# Patient Record
Sex: FEMALE | Race: WHITE | NOT HISPANIC OR LATINO | Employment: FULL TIME | URBAN - METROPOLITAN AREA
[De-identification: names, ages, dates, MRNs, and addresses within clinical notes are randomized per-mention and may not be internally consistent; named-entity substitution may affect disease eponyms.]

---

## 2022-02-23 ENCOUNTER — OFFICE VISIT (OUTPATIENT)
Dept: INTERNAL MEDICINE | Facility: CLINIC | Age: 27
End: 2022-02-23

## 2022-02-23 VITALS
BODY MASS INDEX: 38.77 KG/M2 | DIASTOLIC BLOOD PRESSURE: 86 MMHG | OXYGEN SATURATION: 98 % | HEART RATE: 111 BPM | TEMPERATURE: 98 F | HEIGHT: 67 IN | SYSTOLIC BLOOD PRESSURE: 118 MMHG | WEIGHT: 247 LBS

## 2022-02-23 DIAGNOSIS — R05.9 NEW ONSET COUGH: ICD-10-CM

## 2022-02-23 DIAGNOSIS — M25.532 CHRONIC PAIN OF LEFT WRIST: Primary | ICD-10-CM

## 2022-02-23 DIAGNOSIS — G89.29 CHRONIC PAIN OF LEFT WRIST: Primary | ICD-10-CM

## 2022-02-23 PROCEDURE — 99202 OFFICE O/P NEW SF 15 MIN: CPT | Performed by: FAMILY MEDICINE

## 2022-02-23 RX ORDER — FLUTICASONE PROPIONATE AND SALMETEROL XINAFOATE 115; 21 UG/1; UG/1
2 AEROSOL, METERED RESPIRATORY (INHALATION)
Qty: 12 G | Refills: 6 | Status: SHIPPED | OUTPATIENT
Start: 2022-02-23 | End: 2022-10-13

## 2022-02-23 RX ORDER — ALBUTEROL SULFATE 90 UG/1
2 AEROSOL, METERED RESPIRATORY (INHALATION) EVERY 4 HOURS PRN
Qty: 18 G | Refills: 2 | Status: SHIPPED | OUTPATIENT
Start: 2022-02-23

## 2022-02-23 NOTE — PROGRESS NOTES
"Subjective   Sarah Giron is a 26 y.o. female.     Chief Complaint   Patient presents with   • Wrist Pain     NEW PATIENT  Left wrist popping pain and swelling.  Intermittent.  May have broken around 7-8 grade   • Cough     cough and feeling like throat is closing due to exposure to cigarette smoke.  1 months       Visit Vitals  /86   Pulse 111   Temp 98 °F (36.7 °C)   Ht 168.9 cm (66.5\")   Wt 112 kg (247 lb)   LMP 01/25/2022 (Approximate)   SpO2 98%   Breastfeeding No Comment: nexplanon   BMI 39.27 kg/m²         History of Present Illness   Pt here to establish.  Pt has had about 8 yrs of left wrist pain.  Pt had left wrist fx in middle school. Pt had cast with injury.  Pt thinks that the wrist bones are rubbing together.     Pt thinks that she is allergic to cigarette smoke.  Pt has new downstairs neighbors who smoke and the smoke is coming up into her apartment. Pt's sister is getting headaches from the cigarette smoke.  Pt's brother has asthma.   Pt states that she having coughing spasm.  Pt states that she feels that her throat is closing.  Pt's maternal grandmother is allergic to cigarette smoke.     The following portions of the patient's history were reviewed and updated as appropriate: allergies, current medications, past family history, past medical history, past social history, past surgical history and problem list.    Past Medical History:   Diagnosis Date   • Depression April 2010   • Headache       Past Surgical History:   Procedure Laterality Date   • CHOLECYSTECTOMY  12/2010      Family History   Problem Relation Age of Onset   • Diabetes Mother    • Hypertension Mother    • Depression Mother    • Hyperlipidemia Mother    • Migraine headaches Sister    • Migraine headaches Maternal Grandmother    • Migraine headaches Father    • Diabetes Maternal Grandfather    • Alcohol abuse Paternal Grandmother    • Alcohol abuse Paternal Grandfather    • Asthma Brother       Social History "     Socioeconomic History   • Marital status: Single   Tobacco Use   • Smoking status: Never Smoker   • Smokeless tobacco: Never Used   Vaping Use   • Vaping Use: Never used   Substance and Sexual Activity   • Alcohol use: Not Currently     Alcohol/week: 0.0 standard drinks     Comment: Only on special occasions   • Drug use: Never   • Sexual activity: Not Currently     Partners: Male     Birth control/protection: Abstinence      No Known Allergies    Review of Systems   Respiratory: Positive for cough (From tobacco smoke).    Musculoskeletal: Positive for arthralgias (left wrist pain).       Objective   Physical Exam  Vitals and nursing note reviewed.   Constitutional:       Appearance: She is well-developed.   HENT:      Head: Normocephalic.      Right Ear: External ear normal.      Left Ear: External ear normal.      Nose: Nose normal.   Eyes:      General: Lids are normal.      Conjunctiva/sclera: Conjunctivae normal.      Pupils: Pupils are equal, round, and reactive to light.   Neck:      Thyroid: No thyroid mass or thyromegaly.      Vascular: No carotid bruit.      Trachea: Trachea normal.   Cardiovascular:      Rate and Rhythm: Normal rate and regular rhythm.      Heart sounds: No murmur heard.      Pulmonary:      Effort: Pulmonary effort is normal. No respiratory distress.      Breath sounds: Normal breath sounds. No decreased breath sounds, wheezing, rhonchi or rales.   Chest:      Chest wall: No tenderness.   Abdominal:      General: Bowel sounds are normal.      Palpations: Abdomen is soft.      Tenderness: There is no abdominal tenderness.   Musculoskeletal:         General: Normal range of motion.      Left wrist: Swelling, tenderness (distal radius) and crepitus present.      Cervical back: Normal range of motion and neck supple.   Skin:     General: Skin is warm and dry.   Neurological:      Mental Status: She is alert and oriented to person, place, and time.   Psychiatric:         Behavior:  Behavior normal.         Assessment/Plan   Diagnoses and all orders for this visit:    1. Chronic pain of left wrist (Primary)  -     XR Wrist 3+ View Right; Future  -     Ambulatory Referral to Orthopedic Surgery    2. New onset cough  -     albuterol sulfate  (90 Base) MCG/ACT inhaler; Inhale 2 puffs Every 4 (Four) Hours As Needed for Wheezing.  Dispense: 18 g; Refill: 2  -     fluticasone-salmeterol (Advair HFA) 115-21 MCG/ACT inhaler; Inhale 2 puffs 2 (Two) Times a Day. Rinse mouth after use  Dispense: 12 g; Refill: 6      Suspect tobacco smoke allergy.             Current Outpatient Medications:   •  Etonogestrel (NEXPLANON SC), Inject  under the skin into the appropriate area as directed., Disp: , Rfl:   •  albuterol sulfate  (90 Base) MCG/ACT inhaler, Inhale 2 puffs Every 4 (Four) Hours As Needed for Wheezing., Disp: 18 g, Rfl: 2  •  fluticasone-salmeterol (Advair HFA) 115-21 MCG/ACT inhaler, Inhale 2 puffs 2 (Two) Times a Day. Rinse mouth after use, Disp: 12 g, Rfl: 6    Return if symptoms worsen or fail to improve, for Recheck, Annual.

## 2022-03-03 ENCOUNTER — OFFICE VISIT (OUTPATIENT)
Dept: ORTHOPEDIC SURGERY | Facility: CLINIC | Age: 27
End: 2022-03-03

## 2022-03-03 VITALS
BODY MASS INDEX: 39.68 KG/M2 | WEIGHT: 246.91 LBS | DIASTOLIC BLOOD PRESSURE: 72 MMHG | HEIGHT: 66 IN | SYSTOLIC BLOOD PRESSURE: 109 MMHG

## 2022-03-03 DIAGNOSIS — M25.532 LEFT WRIST PAIN: Primary | ICD-10-CM

## 2022-03-03 PROCEDURE — 99203 OFFICE O/P NEW LOW 30 MIN: CPT | Performed by: PHYSICIAN ASSISTANT

## 2022-03-03 NOTE — PROGRESS NOTES
[facility] – [place] [facility] Note        Subjective     Pain of the Left Wrist      HPI    Sarah Giron is a 26 y.o. female.  This is a very pleasant right-hand-dominant female presenting today to discuss her left wrist pain on and off for approximately 10 years.  She reports she did have prior fracture and remembers being casted for 6 weeks but she does not know exactly what was fracture.  She remembers them telling and there was a wrinkle in the bone.  She complains of painful popping with motion of the wrist.  Pain with lifting and working.  She has treated with anti-inflammatories and a brace.  Here for further evaluation treatment recommendations    Past Medical History:   Diagnosis Date   • Depression April 2010   • Fracture of wrist June 2006   • Headache       Past Surgical History:   Procedure Laterality Date   • CHOLECYSTECTOMY  12/2010      Family History   Problem Relation Age of Onset   • Diabetes Mother    • Hypertension Mother    • Depression Mother    • Hyperlipidemia Mother    • Migraine headaches Sister    • Dislocations Sister         Knee   • Migraine headaches Maternal Grandmother    • Migraine headaches Father    • Diabetes Maternal Grandfather    • Broken bones Maternal Grandfather    • Alcohol abuse Paternal Grandmother    • Alcohol abuse Paternal Grandfather    • Asthma Brother    • Broken bones Brother      Social History     Socioeconomic History   • Marital status: Single   Tobacco Use   • Smoking status: Never Smoker   • Smokeless tobacco: Never Used   Vaping Use   • Vaping Use: Never used   Substance and Sexual Activity   • Alcohol use: Not Currently     Alcohol/week: 0.0 standard drinks     Comment: Only on special occasions   • Drug use: Never   • Sexual activity: Not Currently     Partners: Male     Birth control/protection: Abstinence      Current Outpatient Medications on File Prior to Visit   Medication Sig Dispense Refill   • albuterol sulfate  (90 Base)  "MCG/ACT inhaler Inhale 2 puffs Every 4 (Four) Hours As Needed for Wheezing. 18 g 2   • Etonogestrel (NEXPLANON SC) Inject  under the skin into the appropriate area as directed.     • fluticasone-salmeterol (Advair HFA) 115-21 MCG/ACT inhaler Inhale 2 puffs 2 (Two) Times a Day. Rinse mouth after use 12 g 6     No current facility-administered medications on file prior to visit.      No Known Allergies       Review of Systems   Constitutional: Negative.    HENT: Negative.    Eyes: Negative.    Respiratory: Negative.    Cardiovascular: Negative.    Gastrointestinal: Negative.    Endocrine: Negative.    Genitourinary: Negative.    Musculoskeletal: Positive for arthralgias.   Skin: Negative.    Allergic/Immunologic: Negative.    Neurological: Negative.    Hematological: Negative.    Psychiatric/Behavioral: Negative.         I reviewed the patient's chief complaint, history of present illness, review of systems, past medical history, surgical history, family history, social history, medications and allergy list.        Objective      Physical Exam  /72   Ht 168.9 cm (66.5\")   Wt 112 kg (246 lb 14.6 oz)   BMI 39.26 kg/m²     Body mass index is 39.26 kg/m².    General  Mental Status - alert  General Appearance - cooperative, well groomed, not in acute distress  Orientation - Oriented X3  Build & Nutrition - well developed and well nourished  Posture - normal posture  Gait -normal       Ortho Exam  Left wrist exam: Tender palpation over the radiocarpal joint both dorsal and volar.  More tender volar.  Tender over the DRUJ.  Mild stiffness in the wrist.  Normal forearm pronation and supination.  Palpable crepitus with wrist motion.  No tenderness over the carpals.  Able to make a composite fist with good strength.  Neurovascular intact distally.  Pulses 2+.    Imaging/Studies  Imaging Results (Last 24 Hours)     Procedure Component Value Units Date/Time    XR Wrist 3+ View Left [[id number]] Resulted: 03/03/22 1632     " Updated: 03/03/22 1633    Narrative:      Left Wrist X-Ray    Indication: Pain    Views:  AP, Lateral, and Oblique     Comparison: None    Findings:  No fracture  No bony lesion  Normal soft tissues  Normal joint spaces    Impression:   Negative left wrist x-ray for acute bony abnormality            Assessment    Assessment:  1. Left wrist pain          Plan:  Recommend over-the-counter medication as needed for discomfort  Chronic left wrist pain.  I reviewed today's x-rays clinical findings past and current treatment the patient.  She has had pain intermittently for 10 years.  She has palpable crepitus crepitus with motion.  She has history of prior trauma.  She had a negative x-rays today.  She has braced in the past.  Recommendation today is MRI of the left wrist for further evaluation.  She will return to see me following the MRI or sooner if needed.    History, diagnosis and treatment plan discussed with Dr. Rodriguez.          Ly Starr PA-C  03/04/22  13:40 EST

## 2022-04-02 ENCOUNTER — HOSPITAL ENCOUNTER (OUTPATIENT)
Dept: MRI IMAGING | Facility: HOSPITAL | Age: 27
Discharge: HOME | End: 2022-04-02
Admitting: PHYSICIAN ASSISTANT

## 2022-04-02 DIAGNOSIS — M25.532 LEFT WRIST PAIN: ICD-10-CM

## 2022-04-02 PROCEDURE — 73221 MRI JOINT UPR EXTREM W/O DYE: CPT

## 2022-04-15 ENCOUNTER — OFFICE VISIT (OUTPATIENT)
Dept: ORTHOPEDIC SURGERY | Facility: CLINIC | Age: 27
End: 2022-04-15

## 2022-04-15 VITALS
SYSTOLIC BLOOD PRESSURE: 130 MMHG | WEIGHT: 247 LBS | DIASTOLIC BLOOD PRESSURE: 84 MMHG | HEIGHT: 66 IN | BODY MASS INDEX: 39.7 KG/M2

## 2022-04-15 DIAGNOSIS — M25.532 LEFT WRIST PAIN: Primary | ICD-10-CM

## 2022-04-15 PROCEDURE — 99213 OFFICE O/P EST LOW 20 MIN: CPT | Performed by: PHYSICIAN ASSISTANT

## 2022-04-15 NOTE — PROGRESS NOTES
"        [facility] [place] [facility] [place]        Subjective     CC: Follow-up (6 week follow up -- MRI 4/2/22; Left wrist pain //)      VICKIE Giron is a 26 y.o. female.  She returns today for her left wrist MRI.  She has had chronic pain for 10 years.  She reports no change in her symptoms.  She still has mainly ulnar-sided pain.  Has been bracing and anti-inflammatories.    Overall, patient's symptoms are the same    ROS:    Constiutional:Pt denies fever, chills, nausea, or vomiting.  MSK:as above        Objective      Past Medical History  Past Medical History:   Diagnosis Date   • Depression 04/2010   • Fracture of wrist 06/2006   • Headache          Physical Exam  /84   Ht 168.9 cm (66.5\")   Wt 112 kg (247 lb)   BMI 39.27 kg/m²     Body mass index is 39.27 kg/m².    Patient is well nourished and well developed.        Ortho Exam  Left wrist exam: Mildly tender to palpation at the TFCC and DRUJ.  Normal motion and strength.  Able to make a composite fist with good strength.  Neurovascular intact distally.  Pulses 2+.    Imaging/Labs/EMG Reviewed:  Imaging Results (Last 24 Hours)     ** No results found for the last 24 hours. **            Assessment    Assessment:  1. Left wrist pain        Plan:  1. Recommend over the counter anti-inflammatories for pain and/or swelling  2. Chronic left wrist pain.  I reviewed MRI from 4/2/2022, clinical findings past and current treatment the patient.  MRI shows no evidence of any acute ligamentous, tendon or bony injury.  TFCC is indistinct.  Recommendation today is referral to a hand surgeon.  I have referred her to [facility].  She will return to see us as needed.    History, diagnosis and treatment plan discussed with Dr. Allen.          Ly Starr PA-C  04/20/22  06:12 EDT      "

## 2022-08-23 ENCOUNTER — OFFICE VISIT (OUTPATIENT)
Dept: INTERNAL MEDICINE | Facility: CLINIC | Age: 27
End: 2022-08-23

## 2022-08-23 VITALS
TEMPERATURE: 97.5 F | BODY MASS INDEX: 39.5 KG/M2 | WEIGHT: 245.8 LBS | OXYGEN SATURATION: 97 % | HEIGHT: 66 IN | SYSTOLIC BLOOD PRESSURE: 120 MMHG | HEART RATE: 64 BPM | DIASTOLIC BLOOD PRESSURE: 82 MMHG | RESPIRATION RATE: 20 BRPM

## 2022-08-23 DIAGNOSIS — Z00.00 ANNUAL PHYSICAL EXAM: Primary | ICD-10-CM

## 2022-08-23 DIAGNOSIS — E66.9 OBESITY (BMI 30-39.9): ICD-10-CM

## 2022-08-23 DIAGNOSIS — Z01.812 ENCOUNTER FOR PREPROCEDURE SCREENING LABORATORY TESTING FOR COVID-19: ICD-10-CM

## 2022-08-23 DIAGNOSIS — R05.9 NEW ONSET COUGH: ICD-10-CM

## 2022-08-23 DIAGNOSIS — Z20.822 ENCOUNTER FOR PREPROCEDURE SCREENING LABORATORY TESTING FOR COVID-19: ICD-10-CM

## 2022-08-23 DIAGNOSIS — Z23 NEED FOR TDAP VACCINATION: ICD-10-CM

## 2022-08-23 DIAGNOSIS — G43.709 CHRONIC MIGRAINE WITHOUT AURA WITHOUT STATUS MIGRAINOSUS, NOT INTRACTABLE: ICD-10-CM

## 2022-08-23 DIAGNOSIS — Z97.5 NEXPLANON IN PLACE: ICD-10-CM

## 2022-08-23 DIAGNOSIS — Z11.3 SCREEN FOR STD (SEXUALLY TRANSMITTED DISEASE): ICD-10-CM

## 2022-08-23 PROCEDURE — 90471 IMMUNIZATION ADMIN: CPT | Performed by: NURSE PRACTITIONER

## 2022-08-23 PROCEDURE — 90715 TDAP VACCINE 7 YRS/> IM: CPT | Performed by: NURSE PRACTITIONER

## 2022-08-23 PROCEDURE — 99395 PREV VISIT EST AGE 18-39: CPT | Performed by: NURSE PRACTITIONER

## 2022-08-23 PROCEDURE — 99214 OFFICE O/P EST MOD 30 MIN: CPT | Performed by: NURSE PRACTITIONER

## 2022-08-23 RX ORDER — SUMATRIPTAN 25 MG/1
TABLET, FILM COATED ORAL
Qty: 9 TABLET | Refills: 1 | Status: SHIPPED | OUTPATIENT
Start: 2022-08-23

## 2022-08-23 NOTE — PROGRESS NOTES
Patient Care Team:  Amanda Carreno MD as PCP - General (Family Medicine)     Chief complaint: Patient is in today for a physical          Patient is a 27 y.o. female who presents for her yearly physical exam.     VICKIE Smith is a 27-year-old female patient of Dr. Amanda Carreno who is here for her annual exam.  She is accompanied by her sister for her visit today gets permission to complete visit with her in the.  She tells me that she gets quite anxious at doctor office visits    She has been on advair for 2 months for suspected cough variant asthma.  She tells me that this has not been helping.  She has a cough.  Is worse at night.  No activity limitations.  She denies any formal diagnosis of asthma.  Has never had spirometry or PFTs.    She had nexplanon place in the past. Is overdue to have removed.   She bruised and bled a lot when she had it inserted so she wants to be sure that the provider is aware of this    Migraines-chronic.  She is not currently on any medic patient for this.   sister has migraines.   Pt has been having migraines 2-3 times a week.   Unilateral, pulsating, photosensitive, photosensitive, and some nausea    Sometimes takes ibu or tylenol with mild relief      Health maintenance/lifestyle:  Health Maintenance   Topic Date Due   • ANNUAL PHYSICAL  Never done   • HEPATITIS C SCREENING  Never done   • PAP SMEAR  Never done   • INFLUENZA VACCINE  10/01/2022   • TDAP/TD VACCINES (2 - Td or Tdap) 08/23/2032   • COVID-19 Vaccine  Completed   • Pneumococcal Vaccine 0-64  Aged Out       Immunization History   Administered Date(s) Administered   • COVID-19 (PFIZER) PURPLE CAP 04/15/2021, 05/05/2021, 01/07/2022   • Flu Vaccine Intradermal Quad 18-64YR 01/07/2022   • Influenza, Unspecified 01/07/2022   • Tdap 08/23/2022   Covid vaccine: Has had 2 doses.  Declines further  inluenza: Due fall 2022  Tetanus: Due-counseled we will update today    Mammogram: Recommend starting at age 40      reports previously being sexually active and has had partner(s) who are male. She reports using the following method of birth control/protection: Abstinence.  STI concerns: Denies any symptoms but would like full screening as she has had unprotected intercourse in the past.  Menses:    No LMP recorded.  Pap: due- wants to refer to GYN       Eye exam: UTD  Dental exam: due, advised to schedule    Diet: mix of healthy and unhealthy.   Eats out 3-4 times a week.     Exercise: 3 times a week gym - bikes, walks, weight machines.       Social History     Tobacco Use   Smoking Status Never Smoker   Smokeless Tobacco Never Used     Social History     Substance and Sexual Activity   Alcohol Use Not Currently    Comment: Only on special occasions       PHQ-2 Depression Screening  Little interest or pleasure in doing things? 0-->not at all   Feeling down, depressed, or hopeless? 1-->several days   PHQ-2 Total Score 1           Review of Systems   Constitutional: Negative for appetite change, diaphoresis, fatigue, fever, unexpected weight gain and unexpected weight loss.   Eyes: Negative for blurred vision, double vision, pain and visual disturbance.   Respiratory: Positive for cough and shortness of breath. Negative for chest tightness and wheezing.    Cardiovascular: Negative for chest pain, palpitations and leg swelling.   Gastrointestinal: Negative for abdominal distention, abdominal pain, constipation, diarrhea, nausea and vomiting.   Endocrine: Negative for polydipsia, polyphagia and polyuria.   Genitourinary: Negative for difficulty urinating, frequency and urgency.   Musculoskeletal: Negative for arthralgias and myalgias.   Skin: Negative for color change and rash.   Neurological: Positive for headache. Negative for dizziness, facial asymmetry, weakness, light-headedness, numbness and confusion.   Hematological: Negative for adenopathy. Does not bruise/bleed easily.   Psychiatric/Behavioral: Negative for sleep  disturbance.         History  Social History     Socioeconomic History   • Marital status: Single   Tobacco Use   • Smoking status: Never Smoker   • Smokeless tobacco: Never Used   Vaping Use   • Vaping Use: Never used   Substance and Sexual Activity   • Alcohol use: Not Currently     Comment: Only on special occasions   • Drug use: Never   • Sexual activity: Not Currently     Partners: Male     Birth control/protection: Abstinence     Past Medical History:   Diagnosis Date   • Cholelithiasis 11/2010    Gallbladder removed in Dec. 2010   • Depression 04/2010   • Fracture of wrist 06/2006   • Headache       Past Surgical History:   Procedure Laterality Date   • CHOLECYSTECTOMY  12/2010      No Known Allergies   Family History   Problem Relation Age of Onset   • Diabetes Mother    • Hypertension Mother    • Depression Mother    • Hyperlipidemia Mother    • Migraine headaches Sister    • Dislocations Sister         Knee   • Migraine headaches Maternal Grandmother    • Migraine headaches Father    • Diabetes Maternal Grandfather    • Broken bones Maternal Grandfather    • Alcohol abuse Paternal Grandmother    • Alcohol abuse Paternal Grandfather    • Asthma Brother    • Broken bones Brother        Current Outpatient Medications:   •  albuterol sulfate  (90 Base) MCG/ACT inhaler, Inhale 2 puffs Every 4 (Four) Hours As Needed for Wheezing. (Patient taking differently: Inhale 2 puffs 4 (Four) Times a Day.), Disp: 18 g, Rfl: 2  •  Etonogestrel (NEXPLANON SC), Inject  under the skin into the appropriate area as directed., Disp: , Rfl:   •  fluticasone-salmeterol (Advair HFA) 115-21 MCG/ACT inhaler, Inhale 2 puffs 2 (Two) Times a Day. Rinse mouth after use, Disp: 12 g, Rfl: 6  •  SUMAtriptan (Imitrex) 25 MG tablet, Take one tablet at onset of headache. May repeat dose one time in 2 hours if headache not relieved., Disp: 9 tablet, Rfl: 1                  /82   Pulse 64   Temp 97.5 °F (36.4 °C) (Infrared)   Resp  "20   Ht 167.6 cm (66\")   Wt 111 kg (245 lb 12.8 oz)   SpO2 97%   Breastfeeding No   BMI 39.67 kg/m²       Physical Exam  Vitals and nursing note reviewed.   Constitutional:       General: She is not in acute distress.     Appearance: Normal appearance. She is well-developed. She is not diaphoretic.   HENT:      Head: Normocephalic and atraumatic.      Right Ear: External ear normal.      Left Ear: External ear normal.      Mouth/Throat:      Pharynx: No oropharyngeal exudate.   Eyes:      General: No scleral icterus.        Right eye: No discharge.         Left eye: No discharge.      Conjunctiva/sclera: Conjunctivae normal.   Neck:      Thyroid: No thyromegaly.      Vascular: No JVD.      Trachea: No tracheal deviation.   Cardiovascular:      Rate and Rhythm: Normal rate and regular rhythm.      Heart sounds: Normal heart sounds. No murmur heard.    No friction rub.   Pulmonary:      Effort: Pulmonary effort is normal. No respiratory distress.      Breath sounds: Normal breath sounds. No wheezing or rales.   Chest:      Chest wall: No tenderness.   Abdominal:      General: Bowel sounds are normal. There is no distension.      Palpations: Abdomen is soft. There is no mass.      Tenderness: There is no abdominal tenderness.      Hernia: No hernia is present.   Musculoskeletal:         General: No tenderness or deformity. Normal range of motion.      Cervical back: Normal range of motion and neck supple.   Lymphadenopathy:      Cervical: No cervical adenopathy.   Skin:     General: Skin is warm and dry.      Coloration: Skin is not pale.      Findings: No erythema or rash.   Neurological:      Mental Status: She is alert and oriented to person, place, and time.      Deep Tendon Reflexes: Reflexes are normal and symmetric. Reflexes normal.   Psychiatric:         Behavior: Behavior normal.         Thought Content: Thought content normal.         Judgment: Judgment normal.                   Diagnoses and all orders " for this visit:    1. Annual physical exam (Primary)  -     CBC (No Diff); Future  -     Comprehensive Metabolic Panel; Future  -     Lipid Panel; Future  -     Chlamydia trachomatis, Neisseria gonorrhoeae, Trichomonas vaginalis, PCR - Urine, Urine, Clean Catch; Future  -     Hepatitis Panel, Acute; Future  -     HIV-1 / O / 2 Ag / Antibody 4th Generation; Future  -     HSV 1 & 2 - Specific Antibody, IgG; Future  -     RPR; Future  -     TSH Rfx On Abnormal To Free T4; Future    2. Nexplanon in place  -     Ambulatory Referral to Gynecology    3. Obesity (BMI 30-39.9)  -     CBC (No Diff); Future  -     Comprehensive Metabolic Panel; Future  -     Lipid Panel; Future  -     TSH Rfx On Abnormal To Free T4; Future  Class 2 Severe Obesity (BMI >=35 and <=39.9). Obesity-related health conditions include the following: none. Obesity is unchanged. BMI is is above average; BMI management plan is completed. We discussed low calorie, low carb based diet program, portion control and increasing exercise.    4. Screen for STD (sexually transmitted disease)  -     Chlamydia trachomatis, Neisseria gonorrhoeae, Trichomonas vaginalis, PCR - Urine, Urine, Clean Catch; Future  -     Hepatitis Panel, Acute; Future  -     HIV-1 / O / 2 Ag / Antibody 4th Generation; Future  -     HSV 1 & 2 - Specific Antibody, IgG; Future  -     RPR; Future    5. Chronic migraine without aura without status migrainosus, not intractable  -     SUMAtriptan (Imitrex) 25 MG tablet; Take one tablet at onset of headache. May repeat dose one time in 2 hours if headache not relieved.  Dispense: 9 tablet; Refill: 1    She has not been taking anything prescription for her migraines.  She is interested in something to stop/start.  We will start her on sumatriptan as needed.  Discussed indications for use, expectations, and follow-up.  Drugs of the 'triptan' class are very effective for migraine treatment but can have significant side effects which are explained  carefully to her. I have reviewed the contraindication with ischemic heart disease with the patient, and the patient and I agree that her risk of ischemic disease is very low and it is appropriate to use a triptan medication. If such risk factors change in the future she will stop using the drug and let me know. Side effects such as transient chest or neck pain may occur, nausea, diarrhea, tingling, flushing are common. Re-dosing, if required, should be done once after a 2-hour interval.    6. New onset cough  -     Only Spirometry; Future  Recommend that she have spirometry to further evaluate.  She should continue the Advair and follow-up with her PCP in about 6 weeks.  7. Need for Tdap vaccination  -     Tdap Vaccine Greater Than or Equal To 6yo IM    8. Encounter for preprocedure screening laboratory testing for COVID-19  -     COVID PRE-OP / PRE-PROCEDURE SCREENING ORDER (NO ISOLATION) - Swab, Nasopharynx; Future         Labs  ordered as above- will notify of results and treat accordingly. If patient has not received results within one week via mychart or letter, they will notify my office  Immunizations and screenings: Other preventative/screenings are up-to-date/addressed as noted in the HPI.  Counseling: The patient is advised to begin progressive daily aerobic exercise program, follow a low fat, low cholesterol diet, attempt to lose weight, continue current medications and return for routine annual checkups  Health Maintenance reviewed -.    Follow up: Return in about 6 weeks (around 10/4/2022) for with PCP.  Plan of care discussed with pt. They verbalized understanding and agreement.     Electronically signed by : EFE Lane            Dictated Utilizing Dragon Dictation   Please note that portions of this note were completed with a voice recognition program.   Part of this note may be an electronic transcription/translation of spoken language to printed text using the Dragon Dictation System.

## 2022-08-27 ENCOUNTER — LAB (OUTPATIENT)
Dept: LAB | Facility: HOSPITAL | Age: 27
End: 2022-08-27

## 2022-08-27 DIAGNOSIS — Z11.3 SCREEN FOR STD (SEXUALLY TRANSMITTED DISEASE): ICD-10-CM

## 2022-08-27 DIAGNOSIS — E66.9 OBESITY (BMI 30-39.9): ICD-10-CM

## 2022-08-27 DIAGNOSIS — Z00.00 ANNUAL PHYSICAL EXAM: ICD-10-CM

## 2022-08-27 LAB
ALBUMIN SERPL-MCNC: 4.5 G/DL (ref 3.5–5.2)
ALBUMIN/GLOB SERPL: 1.9 G/DL
ALP SERPL-CCNC: 71 U/L (ref 39–117)
ALT SERPL W P-5'-P-CCNC: 24 U/L (ref 1–33)
ANION GAP SERPL CALCULATED.3IONS-SCNC: 10 MMOL/L (ref 5–15)
AST SERPL-CCNC: 19 U/L (ref 1–32)
BILIRUB SERPL-MCNC: 0.4 MG/DL (ref 0–1.2)
BUN SERPL-MCNC: 9 MG/DL (ref 6–20)
BUN/CREAT SERPL: 10.6 (ref 7–25)
CALCIUM SPEC-SCNC: 9.2 MG/DL (ref 8.6–10.5)
CHLORIDE SERPL-SCNC: 105 MMOL/L (ref 98–107)
CHOLEST SERPL-MCNC: 189 MG/DL (ref 0–200)
CO2 SERPL-SCNC: 23 MMOL/L (ref 22–29)
CREAT SERPL-MCNC: 0.85 MG/DL (ref 0.57–1)
DEPRECATED RDW RBC AUTO: 36.8 FL (ref 37–54)
EGFRCR SERPLBLD CKD-EPI 2021: 95.2 ML/MIN/1.73
ERYTHROCYTE [DISTWIDTH] IN BLOOD BY AUTOMATED COUNT: 11.7 % (ref 12.3–15.4)
GLOBULIN UR ELPH-MCNC: 2.4 GM/DL
GLUCOSE SERPL-MCNC: 89 MG/DL (ref 65–99)
HAV IGM SERPL QL IA: NORMAL
HBV CORE IGM SERPL QL IA: NORMAL
HBV SURFACE AG SERPL QL IA: NORMAL
HCT VFR BLD AUTO: 40.9 % (ref 34–46.6)
HCV AB SER DONR QL: NORMAL
HDLC SERPL-MCNC: 46 MG/DL (ref 40–60)
HGB BLD-MCNC: 14 G/DL (ref 12–15.9)
HIV1+2 AB SER QL: NORMAL
LDLC SERPL CALC-MCNC: 130 MG/DL (ref 0–100)
LDLC/HDLC SERPL: 2.8 {RATIO}
MCH RBC QN AUTO: 29.4 PG (ref 26.6–33)
MCHC RBC AUTO-ENTMCNC: 34.2 G/DL (ref 31.5–35.7)
MCV RBC AUTO: 85.7 FL (ref 79–97)
PLATELET # BLD AUTO: 280 10*3/MM3 (ref 140–450)
PMV BLD AUTO: 10.7 FL (ref 6–12)
POTASSIUM SERPL-SCNC: 4.1 MMOL/L (ref 3.5–5.2)
PROT SERPL-MCNC: 6.9 G/DL (ref 6–8.5)
RBC # BLD AUTO: 4.77 10*6/MM3 (ref 3.77–5.28)
SODIUM SERPL-SCNC: 138 MMOL/L (ref 136–145)
TRIGL SERPL-MCNC: 72 MG/DL (ref 0–150)
TSH SERPL DL<=0.05 MIU/L-ACNC: 0.97 UIU/ML (ref 0.27–4.2)
VLDLC SERPL-MCNC: 13 MG/DL (ref 5–40)
WBC NRBC COR # BLD: 10.16 10*3/MM3 (ref 3.4–10.8)

## 2022-08-27 PROCEDURE — 86695 HERPES SIMPLEX TYPE 1 TEST: CPT

## 2022-08-27 PROCEDURE — 87491 CHLMYD TRACH DNA AMP PROBE: CPT

## 2022-08-27 PROCEDURE — 87661 TRICHOMONAS VAGINALIS AMPLIF: CPT

## 2022-08-27 PROCEDURE — 86592 SYPHILIS TEST NON-TREP QUAL: CPT

## 2022-08-27 PROCEDURE — 87591 N.GONORRHOEAE DNA AMP PROB: CPT

## 2022-08-27 PROCEDURE — 80074 ACUTE HEPATITIS PANEL: CPT

## 2022-08-27 PROCEDURE — 80050 GENERAL HEALTH PANEL: CPT

## 2022-08-27 PROCEDURE — G0432 EIA HIV-1/HIV-2 SCREEN: HCPCS

## 2022-08-27 PROCEDURE — 86696 HERPES SIMPLEX TYPE 2 TEST: CPT

## 2022-08-27 PROCEDURE — 80061 LIPID PANEL: CPT

## 2022-08-28 LAB — RPR SER QL: NORMAL

## 2022-08-29 LAB
HSV1 IGG SER IA-ACNC: 38.6 INDEX (ref 0–0.9)
HSV2 IGG SER IA-ACNC: <0.91 INDEX (ref 0–0.9)

## 2022-08-31 LAB
C TRACH RRNA SPEC QL NAA+PROBE: NEGATIVE
N GONORRHOEA RRNA SPEC QL NAA+PROBE: NEGATIVE
T VAGINALIS RRNA SPEC QL NAA+PROBE: NEGATIVE

## 2022-09-12 ENCOUNTER — TELEPHONE (OUTPATIENT)
Dept: INTERNAL MEDICINE | Facility: CLINIC | Age: 27
End: 2022-09-12

## 2022-09-12 NOTE — TELEPHONE ENCOUNTER
----- Message from Le Garcia MA sent at 9/12/2022  9:09 AM EDT -----  LVM for the patient to return our call, office number was provided      HUB TO READ: Please provide the patient with the below provider comments.     Your labs are negative for gonorrhea, trichomonas, chlamydia, genital herpes, syphilis, HIV, and hepatitis.  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  There is nothing to do with this at this time.  Your LDL cholesterol is slightly elevated.  I would recommend you focus on healthy diet and increasing physical activity for this.  Your other labs are in acceptable parameters.

## 2022-09-20 ENCOUNTER — TELEPHONE (OUTPATIENT)
Dept: INTERNAL MEDICINE | Facility: CLINIC | Age: 27
End: 2022-09-20

## 2022-09-20 ENCOUNTER — TELEMEDICINE (OUTPATIENT)
Dept: INTERNAL MEDICINE | Facility: CLINIC | Age: 27
End: 2022-09-20

## 2022-09-20 DIAGNOSIS — J01.00 ACUTE MAXILLARY SINUSITIS, RECURRENCE NOT SPECIFIED: Primary | ICD-10-CM

## 2022-09-20 DIAGNOSIS — H92.01 ACUTE OTALGIA, RIGHT: ICD-10-CM

## 2022-09-20 PROCEDURE — 99213 OFFICE O/P EST LOW 20 MIN: CPT | Performed by: NURSE PRACTITIONER

## 2022-09-20 RX ORDER — CEFUROXIME AXETIL 250 MG/1
250 TABLET ORAL 2 TIMES DAILY
Qty: 20 TABLET | Refills: 0 | Status: SHIPPED | OUTPATIENT
Start: 2022-09-20 | End: 2022-09-30

## 2022-09-20 RX ORDER — METHYLPREDNISOLONE 4 MG/1
TABLET ORAL
Qty: 21 EACH | Refills: 0 | Status: SHIPPED | OUTPATIENT
Start: 2022-09-20 | End: 2022-10-05

## 2022-10-05 ENCOUNTER — OFFICE VISIT (OUTPATIENT)
Dept: INTERNAL MEDICINE | Facility: CLINIC | Age: 27
End: 2022-10-05

## 2022-10-05 VITALS
BODY MASS INDEX: 39.7 KG/M2 | HEIGHT: 66 IN | WEIGHT: 247 LBS | TEMPERATURE: 98.2 F | OXYGEN SATURATION: 98 % | SYSTOLIC BLOOD PRESSURE: 122 MMHG | DIASTOLIC BLOOD PRESSURE: 88 MMHG | HEART RATE: 66 BPM

## 2022-10-05 DIAGNOSIS — R05.1 ACUTE COUGH: Primary | ICD-10-CM

## 2022-10-05 DIAGNOSIS — R05.8 ALLERGIC COUGH: ICD-10-CM

## 2022-10-05 PROCEDURE — 99213 OFFICE O/P EST LOW 20 MIN: CPT | Performed by: FAMILY MEDICINE

## 2022-10-05 RX ORDER — MONTELUKAST SODIUM 10 MG/1
10 TABLET ORAL NIGHTLY
Qty: 30 TABLET | Refills: 2 | Status: SHIPPED | OUTPATIENT
Start: 2022-10-05 | End: 2023-01-03

## 2022-10-05 NOTE — PROGRESS NOTES
"Subjective   Sarah Giron is a 27 y.o. female.     Chief Complaint   Patient presents with   • lung issue     Follow up.  Has not heard anything regarding referral       Visit Vitals  /88   Pulse 66   Temp 98.2 °F (36.8 °C)   Ht 167.6 cm (66\")   Wt 112 kg (247 lb)   LMP  (LMP Unknown) Comment: maybe 3 weeks ago   SpO2 98%   BMI 39.87 kg/m²         Cough  This is a recurrent problem. The current episode started more than 1 month ago. The problem has been waxing and waning. The cough is non-productive. Associated symptoms include chest pain (when coughing) and headaches. Pertinent negatives include no chills, ear congestion, ear pain, fever, heartburn, hemoptysis, myalgias, nasal congestion, postnasal drip, rash, rhinorrhea, sore throat, shortness of breath, sweats, weight loss or wheezing. The symptoms are aggravated by fumes. She has tried a beta-agonist inhaler and steroid inhaler for the symptoms. The treatment provided moderate relief. There is no history of asthma, bronchiectasis, bronchitis, COPD, emphysema, environmental allergies or pneumonia.      Pt is here with her sister, who has her permission to be in the room.  Pt is having a deep cough intermittently. Pt has cough to cigarette smoker.  Pt is going to the gym and having problem with taking a deep enough breath and pt is feeling winded. Pt will have chest tightness.  Pt's brother has sport induced asthma.   Advair is helping, but not as much as she would like.   Downstairs neighbors smoke and pt gets smoke in her apartment.   Pt will have severe cough from the cigarette smoke.     Pt is taking excedrin migraine and it is helping the migraines.       Imitrex does help migraine.Answers for HPI/ROS submitted by the patient on 10/1/2022  Please describe your symptoms.: Breathing irritation from cigarette smoke  Have you had these symptoms before?: Yes  How long have you been having these symptoms?: Greater than 2 weeks  Please list any " medications you are currently taking for this condition.: Xiang hfa, albuterol inhaler  Please describe any probable cause for these symptoms. : Cigarette smoke. The advair is not helping  What is the primary reason for your visit?: Other        The following portions of the patient's history were reviewed and updated as appropriate: allergies, current medications, past family history, past medical history, past social history, past surgical history and problem list.    Past Medical History:   Diagnosis Date   • Cholelithiasis 11/2010    Gallbladder removed in Dec. 2010   • Depression 04/2010   • Fracture of wrist 06/2006   • Headache       Past Surgical History:   Procedure Laterality Date   • CHOLECYSTECTOMY  12/2010      Family History   Problem Relation Age of Onset   • Diabetes Mother    • Hypertension Mother    • Depression Mother    • Hyperlipidemia Mother    • Migraine headaches Sister    • Dislocations Sister         Knee   • Migraine headaches Maternal Grandmother    • Migraine headaches Father    • Diabetes Maternal Grandfather    • Broken bones Maternal Grandfather    • Alcohol abuse Paternal Grandmother    • Alcohol abuse Paternal Grandfather    • Asthma Brother    • Broken bones Brother       Social History     Socioeconomic History   • Marital status: Single   Tobacco Use   • Smoking status: Never Smoker   • Smokeless tobacco: Never Used   Vaping Use   • Vaping Use: Never used   Substance and Sexual Activity   • Alcohol use: Not Currently     Comment: Only on special occasions   • Drug use: Never   • Sexual activity: Not Currently     Partners: Male     Birth control/protection: Abstinence      No Known Allergies    Review of Systems   Constitutional: Negative for chills, fever and weight loss.   HENT: Negative for ear pain, postnasal drip, rhinorrhea and sore throat.    Respiratory: Positive for cough. Negative for hemoptysis, shortness of breath and wheezing.    Cardiovascular: Positive for chest  pain (when coughing).   Gastrointestinal: Negative for heartburn.   Musculoskeletal: Negative for myalgias.   Skin: Negative for rash.   Allergic/Immunologic: Negative for environmental allergies.   Neurological: Positive for headaches.       Objective   Physical Exam  Vitals and nursing note reviewed.   Constitutional:       Appearance: She is well-developed.   HENT:      Head: Normocephalic.      Right Ear: External ear normal.      Left Ear: External ear normal.      Nose: Nose normal.   Eyes:      General: Lids are normal.      Conjunctiva/sclera: Conjunctivae normal.      Pupils: Pupils are equal, round, and reactive to light.   Neck:      Thyroid: No thyroid mass or thyromegaly.      Vascular: No carotid bruit.      Trachea: Trachea normal.   Cardiovascular:      Rate and Rhythm: Normal rate and regular rhythm.      Heart sounds: No murmur heard.  Pulmonary:      Effort: Pulmonary effort is normal. No respiratory distress.      Breath sounds: Normal breath sounds. No decreased breath sounds, wheezing, rhonchi or rales.   Chest:      Chest wall: No tenderness.   Abdominal:      General: Bowel sounds are normal.      Palpations: Abdomen is soft.      Tenderness: There is no abdominal tenderness.   Musculoskeletal:         General: Normal range of motion.      Cervical back: Normal range of motion and neck supple.   Skin:     General: Skin is warm and dry.   Neurological:      Mental Status: She is alert and oriented to person, place, and time.   Psychiatric:         Behavior: Behavior normal.         Assessment & Plan   Diagnoses and all orders for this visit:    1. Acute cough (Primary)    2. Allergic cough  -     montelukast (Singulair) 10 MG tablet; Take 1 tablet by mouth Every Night.  Dispense: 30 tablet; Refill: 2  -     Full Pulmonary Function Test With Bronchodilator; Future        Pt will get flu shot at pharmacy.   We will add Singulair and see if patient has less reactivity to cigarette smoke.            Current Outpatient Medications:   •  albuterol sulfate  (90 Base) MCG/ACT inhaler, Inhale 2 puffs Every 4 (Four) Hours As Needed for Wheezing. (Patient taking differently: Inhale 2 puffs 4 (Four) Times a Day.), Disp: 18 g, Rfl: 2  •  Etonogestrel (NEXPLANON SC), Inject  under the skin into the appropriate area as directed., Disp: , Rfl:   •  fluticasone-salmeterol (Advair HFA) 115-21 MCG/ACT inhaler, Inhale 2 puffs 2 (Two) Times a Day. Rinse mouth after use, Disp: 12 g, Rfl: 6  •  SUMAtriptan (Imitrex) 25 MG tablet, Take one tablet at onset of headache. May repeat dose one time in 2 hours if headache not relieved., Disp: 9 tablet, Rfl: 1  •  montelukast (Singulair) 10 MG tablet, Take 1 tablet by mouth Every Night., Disp: 30 tablet, Rfl: 2    Return in about 4 weeks (around 11/2/2022), or if symptoms worsen or fail to improve, for Recheck.

## 2022-10-13 DIAGNOSIS — R05.9 NEW ONSET COUGH: ICD-10-CM

## 2022-10-13 RX ORDER — FLUTICASONE PROPIONATE AND SALMETEROL XINAFOATE 115; 21 UG/1; UG/1
AEROSOL, METERED RESPIRATORY (INHALATION)
Qty: 12 G | Refills: 6 | Status: SHIPPED | OUTPATIENT
Start: 2022-10-13

## 2022-10-20 ENCOUNTER — HOSPITAL ENCOUNTER (OUTPATIENT)
Dept: PULMONOLOGY | Facility: HOSPITAL | Age: 27
Discharge: HOME | End: 2022-10-20
Admitting: FAMILY MEDICINE

## 2022-10-20 DIAGNOSIS — R05.8 ALLERGIC COUGH: ICD-10-CM

## 2022-10-20 PROCEDURE — 94729 DIFFUSING CAPACITY: CPT

## 2022-10-20 PROCEDURE — 94010 BREATHING CAPACITY TEST: CPT

## 2022-10-20 PROCEDURE — 94727 GAS DIL/WSHOT DETER LNG VOL: CPT | Performed by: INTERNAL MEDICINE

## 2022-10-20 PROCEDURE — 94010 BREATHING CAPACITY TEST: CPT | Performed by: INTERNAL MEDICINE

## 2022-10-20 PROCEDURE — 94727 GAS DIL/WSHOT DETER LNG VOL: CPT

## 2022-10-20 PROCEDURE — 94729 DIFFUSING CAPACITY: CPT | Performed by: INTERNAL MEDICINE

## 2022-12-31 DIAGNOSIS — R05.8 ALLERGIC COUGH: ICD-10-CM

## 2023-01-03 RX ORDER — MONTELUKAST SODIUM 10 MG/1
TABLET ORAL
Qty: 30 TABLET | Refills: 0 | Status: SHIPPED | OUTPATIENT
Start: 2023-01-03 | End: 2023-02-02

## 2023-01-17 ENCOUNTER — OFFICE VISIT (OUTPATIENT)
Dept: OBSTETRICS AND GYNECOLOGY | Facility: CLINIC | Age: 28
End: 2023-01-17
Payer: COMMERCIAL

## 2023-01-17 VITALS
WEIGHT: 240 LBS | HEIGHT: 66 IN | DIASTOLIC BLOOD PRESSURE: 80 MMHG | BODY MASS INDEX: 38.57 KG/M2 | RESPIRATION RATE: 14 BRPM | SYSTOLIC BLOOD PRESSURE: 112 MMHG

## 2023-01-17 DIAGNOSIS — Z01.419 WOMEN'S ANNUAL ROUTINE GYNECOLOGICAL EXAMINATION: Primary | ICD-10-CM

## 2023-01-17 DIAGNOSIS — Z01.419 PAP TEST, AS PART OF ROUTINE GYNECOLOGICAL EXAMINATION: ICD-10-CM

## 2023-01-17 DIAGNOSIS — Z30.46 NEXPLANON REMOVAL: ICD-10-CM

## 2023-01-17 PROCEDURE — 11982 REMOVE DRUG IMPLANT DEVICE: CPT | Performed by: STUDENT IN AN ORGANIZED HEALTH CARE EDUCATION/TRAINING PROGRAM

## 2023-01-17 PROCEDURE — 99385 PREV VISIT NEW AGE 18-39: CPT | Performed by: STUDENT IN AN ORGANIZED HEALTH CARE EDUCATION/TRAINING PROGRAM

## 2023-01-17 NOTE — PROGRESS NOTES
"Subjective   Chief Complaint   Patient presents with   • Annual Exam     Desires Nexplanon removal only.     Sarah Giron is a 27 y.o. year old  presenting to be seen for her annual exam and Nexplanon removal. She reports it  5 years ago, and has been in place for 8 years. She reports she is not sexually active currently, and decliens any other form of contraception.     HPV vaccine: per patient, completed at age 14.     SEXUAL Hx:  She is not currently sexually active.  In the past year there there has been NO new sexual partners.    She would not like to be screened for STD's at today's exam.  Current birth control method: Nexplanon, over due for removal.   She is happy with her current method of contraception and does not want to discuss alternative methods of contraception.  MENSTRUAL Hx:  Patient's last menstrual period was 2022 (exact date).  In the past 6 months her cycles have been regular, predictable and occur monthly.  Her menstrual flow is typically normal.   Each month on average there are roughly 3 day(s) of very heavy flow.  Intermenstrual bleeding is absent.    Post-coital bleeding is n/a.  Dysmenorrhea: mild  PMS: none  Her cycles are not a source of concern for her that she wishes to discuss today.  HEALTH Hx:  She exercises regularly: yes. Gym 3x week, cardio plus weight machines.   She wears her seat belt: yes.  She has concerns about domestic violence: no.  OTHER THINGS SHE WANTS TO DISCUSS TODAY:  Nothing else    The following portions of the patient's history were reviewed and updated as appropriate:problem list, current medications, allergies, past family history, past medical history, past social history and past surgical history.    Social History    Tobacco Use      Smoking status: Never      Smokeless tobacco: Never         Objective   /80 (BP Location: Right arm, Patient Position: Sitting, Cuff Size: Large Adult)   Resp 14   Ht 167.6 cm (66\")   Wt 109 kg " (240 lb)   LMP 12/23/2022 (Exact Date) Comment: Nexplanon  BMI 38.74 kg/m²     General:  well developed; well nourished  no acute distress  obese - Body mass index is 38.74 kg/m².   Skin:  No suspicious lesions seen   Breasts:  Examined in supine position  Symmetric without masses or skin dimpling  Nipples normal without inversion, lesions or discharge  There are no palpable axillary nodes   Pelvis: Clinical staff was present for exam  External genitalia:  normal appearance of the external genitalia including Bartholin's and Natalbany's glands.  :  urethral meatus normal;  Vaginal:  normal pink mucosa without prolapse or lesions.  Cervix:  normal appearance. ectropian present;  Uterus:  normal size, shape and consistency.  Adnexa:  normal bimanual exam of the adnexa.  Rectal:  digital rectal exam not performed; anus visually normal appearing.        Assessment   1. Normal GYN exam   2. Nexplanon removal   3. Routine pap smear     Plan   1. Pap was done today.  If she does not receive the results of the Pap within 2 weeks  time, she was instructed to call to find out the results.  I explained to Sarah that the recommendations for Pap smear interval in a low risk patient's has lengthened to 3 years time.  I encouraged her to be seen yearly for a full physical exam including breast and pelvic exam even during the off years when PAP's will not be performed.  2. Nexplanon removed, see below for details.   3. The importance of keeping all planned follow-up and taking all medications as prescribed was emphasized.  4. Follow up for annual exam in 1 year or sooner PRN    No orders of the defined types were placed in this encounter.         This note was electronically signed.    Jane Ramírez MD     Nexplanon Removal    Date of procedure:  1/17/2023    Risks and benefits discussed? yes  All questions answered? yes  Consents given by the patient  Written consent obtained? yes    Local anesthesia used:  yes - 2.5 cc's of   Meds; anesthesia local: None, 1% lidocaine    Procedure documentation:    The upper left arm (non-dominant) was marked at the intended site of removal.  Betadine was used to cleanse the skin.  Local anesthesia was injected.  A vertical incision was created at the distal tip of the implant.  The implant was removed intact without difficulty.  Steri-strips were then placed across the site of insertion and the arm was wrapped.    She tolerated the procedure well.  There were no complications.  EBL was minimal.    Post procedure instructions: Remove the wrapping in 24 hours and the steri-strips in 5 days.    Follow up needed: PRN    This note was electronically signed.    Jane Ramírez MD   January 17, 2023

## 2023-01-18 ENCOUNTER — PATIENT ROUNDING (BHMG ONLY) (OUTPATIENT)
Dept: OBSTETRICS AND GYNECOLOGY | Facility: CLINIC | Age: 28
End: 2023-01-18
Payer: COMMERCIAL

## 2023-01-18 NOTE — PROGRESS NOTES
A [employer] message has been sent to the patient for PATIENT ROUNDING with [facility].

## 2023-01-19 LAB — REF LAB TEST METHOD: NORMAL

## 2023-02-02 DIAGNOSIS — R05.8 ALLERGIC COUGH: ICD-10-CM

## 2023-02-02 RX ORDER — MONTELUKAST SODIUM 10 MG/1
TABLET ORAL
Qty: 14 TABLET | Refills: 0 | Status: SHIPPED | OUTPATIENT
Start: 2023-02-02 | End: 2023-02-06

## 2023-02-06 ENCOUNTER — PATIENT MESSAGE (OUTPATIENT)
Dept: INTERNAL MEDICINE | Facility: CLINIC | Age: 28
End: 2023-02-06
Payer: COMMERCIAL

## 2023-02-06 NOTE — TELEPHONE ENCOUNTER
From: Sarah Giron  To: Amanda Carreno  Sent: 2/6/2023 7:49 AM EST  Subject: Montelukast Sodium    Can I stop taking this? I have moved away from the source of the irritation and am no longer in contact with it.     Thank you!

## 2023-02-27 RX ORDER — MONTELUKAST SODIUM 10 MG/1
TABLET ORAL
Qty: 14 TABLET | OUTPATIENT
Start: 2023-02-27

## 2023-04-26 ENCOUNTER — OFFICE VISIT (OUTPATIENT)
Dept: INTERNAL MEDICINE | Facility: CLINIC | Age: 28
End: 2023-04-26
Payer: COMMERCIAL

## 2023-04-26 VITALS
TEMPERATURE: 97.3 F | HEART RATE: 66 BPM | WEIGHT: 241 LBS | OXYGEN SATURATION: 98 % | RESPIRATION RATE: 16 BRPM | HEIGHT: 66 IN | SYSTOLIC BLOOD PRESSURE: 124 MMHG | DIASTOLIC BLOOD PRESSURE: 72 MMHG | BODY MASS INDEX: 38.73 KG/M2

## 2023-04-26 DIAGNOSIS — G43.709 CHRONIC MIGRAINE WITHOUT AURA WITHOUT STATUS MIGRAINOSUS, NOT INTRACTABLE: Primary | ICD-10-CM

## 2023-04-26 DIAGNOSIS — R51.9 CHRONIC DAILY HEADACHE: ICD-10-CM

## 2023-04-26 DIAGNOSIS — R42 DIZZINESS: ICD-10-CM

## 2023-04-26 PROCEDURE — 99214 OFFICE O/P EST MOD 30 MIN: CPT | Performed by: NURSE PRACTITIONER

## 2023-04-26 PROCEDURE — 96372 THER/PROPH/DIAG INJ SC/IM: CPT | Performed by: NURSE PRACTITIONER

## 2023-04-26 RX ORDER — KETOROLAC TROMETHAMINE 30 MG/ML
60 INJECTION, SOLUTION INTRAMUSCULAR; INTRAVENOUS ONCE
Status: COMPLETED | OUTPATIENT
Start: 2023-04-26 | End: 2023-04-26

## 2023-04-26 RX ORDER — TOPIRAMATE 25 MG/1
25 TABLET ORAL NIGHTLY
Qty: 30 TABLET | Refills: 1 | Status: SHIPPED | OUTPATIENT
Start: 2023-04-26

## 2023-04-26 RX ADMIN — KETOROLAC TROMETHAMINE 60 MG: 30 INJECTION, SOLUTION INTRAMUSCULAR; INTRAVENOUS at 16:41

## 2023-04-26 NOTE — PROGRESS NOTES
Sarah Giron presents to [facility] for     Chief Complaint  Chief Complaint   Patient presents with   • Migraine     Follow up. Pt states she has a headache now dizziness.  She states she has a headache every day.  Not always migraine level but some form of headache. Would like to discuss changing medication.  She's had some lightheadedness with the current medication       PCP:  Amanda Carreno MD    Subjective        History of Present Illness  Migraines-chronic.     sister has migraines.   Pt has been having migraines 2-3 times a week.   Unilateral, pulsating, photosensitive, photosensitive, and some nausea    Sometimes takes ibu or tylenol with mild relief  Dizziness, decreased vision.    has headaches every day. tends to be worse toward the ronny of the day.   jhas migraine with visualk aura bout once every 1-2 months.    has never had imaging.   Has been dealing with migraines for the last 8 years.      always behind the right eye.   Feels like eye is swelling and has pressure.         Review of Systems   Constitutional: Negative for fatigue, fever and unexpected weight loss.   Eyes: Positive for visual disturbance. Negative for blurred vision and double vision.   Respiratory: Negative for cough, shortness of breath and wheezing.    Cardiovascular: Negative for chest pain, palpitations and leg swelling.   Gastrointestinal: Negative for abdominal pain, constipation, diarrhea, nausea and vomiting.   Genitourinary: Negative for difficulty urinating, frequency and urgency.   Musculoskeletal: Negative for arthralgias and myalgias.   Skin: Negative for color change and rash.   Neurological: Positive for dizziness and headache. Negative for weakness.   Hematological: Negative for adenopathy. Does not bruise/bleed easily.         No Known Allergies  Current Outpatient Medications on File Prior to Visit   Medication Sig Dispense Refill   • SUMAtriptan (Imitrex) 25 MG tablet Take  "one tablet at onset of headache. May repeat dose one time in 2 hours if headache not relieved. 9 tablet 1     No current facility-administered medications on file prior to visit.         The following portions of the patient's history were reviewed and updated as appropriate: allergies, current medications, past family history, past medical history, past social history, past surgical history and problem list and are available for review within electronic record    Objective     Result Review :                    Vital Signs:   /72 (BP Location: Right arm, Patient Position: Sitting, Cuff Size: Adult)   Pulse 66   Temp 97.3 °F (36.3 °C) (Temporal)   Resp 16   Ht 167.6 cm (66\")   Wt 109 kg (241 lb)   SpO2 98%   BMI 38.90 kg/m²         Physical Exam  Constitutional:       Appearance: Normal appearance. She is well-developed.   HENT:      Head: Normocephalic and atraumatic.   Eyes:      General: No scleral icterus.     Conjunctiva/sclera: Conjunctivae normal.   Cardiovascular:      Rate and Rhythm: Normal rate and regular rhythm.      Heart sounds: Normal heart sounds.   Pulmonary:      Effort: Pulmonary effort is normal. No respiratory distress.      Breath sounds: Normal breath sounds.   Abdominal:      General: Bowel sounds are normal.      Palpations: Abdomen is soft.      Tenderness: There is no abdominal tenderness.   Musculoskeletal:         General: Normal range of motion.      Cervical back: Normal range of motion.      Right lower leg: No edema.      Left lower leg: No edema.   Skin:     General: Skin is warm and dry.   Neurological:      General: No focal deficit present.      Mental Status: She is alert and oriented to person, place, and time.      Sensory: Sensation is intact.      Motor: Motor function is intact.      Coordination: Coordination is intact.      Gait: Gait is intact.   Psychiatric:         Attention and Perception: Attention normal.         Mood and Affect: Mood and affect " normal.         Behavior: Behavior normal. Behavior is cooperative.         Thought Content: Thought content normal.         Cognition and Memory: Cognition normal.         Judgment: Judgment normal.                 Assessment and Plan      Diagnoses and all orders for this visit:    1. Chronic migraine without aura without status migrainosus, not intractable (Primary)  -     MRI Brain Without Contrast; Future  -     topiramate (TOPAMAX) 25 MG tablet; Take 1 tablet by mouth Every Night.  Dispense: 30 tablet; Refill: 1  -     ketorolac (TORADOL) injection 60 mg    2. Dizziness  -     MRI Brain Without Contrast; Future    3. Chronic daily headache  -     MRI Brain Without Contrast; Future  -     topiramate (TOPAMAX) 25 MG tablet; Take 1 tablet by mouth Every Night.  Dispense: 30 tablet; Refill: 1  -     ketorolac (TORADOL) injection 60 mg    She does have some ongoing migraines with some worsening symptoms so we will go ahead and check an MRI to further evaluate given the frequency and increasing intensity of these migraines.  She has never had imaging to her knowledge..  We can also go ahead and do a Toradol injection in office for acute relief although headache is minimal at this point.  We will add topiramate 25 mg at at bedtime.  Adverse effects and expectations discussed.  She should follow-up in about 4 weeks for reevaluation.      Follow Up     Patient was given instructions and counseling regarding her condition or for health maintenance advice. Please see specific information pulled into the AVS if appropriate.   Any new medication's adverse effects have been discussed in detail with patient  Patient was encouraged to keep me informed of any acute changes, lack of improvement, or any new concerning symptoms.    Return in about 4 weeks (around 5/24/2023) for Recheck.          Dictated Utilizing Dragon Dictation   Please note that portions of this note were completed with a voice recognition program.   Part of  this note may be an electronic transcription/translation of spoken language to printed text using the Dragon Dictation System.

## 2023-05-04 ENCOUNTER — TELEPHONE (OUTPATIENT)
Dept: INTERNAL MEDICINE | Facility: CLINIC | Age: 28
End: 2023-05-04

## 2023-05-04 NOTE — TELEPHONE ENCOUNTER
Caller: Sarah Giron    Relationship: Self    Best call back number: [phone]    What was the call regarding: PATIENT HAS A REFERRAL FOR AN MRI AND WAS TOLD IF SHE HAS NOT HEARD ANYTHING AFTER A WEEK TO CALL BACK AND SHE HAS NOT HEARD ANYTHING ABOUT THIS YET.     Do you require a callback: YES

## 2023-05-08 ENCOUNTER — TELEPHONE (OUTPATIENT)
Dept: INTERNAL MEDICINE | Facility: CLINIC | Age: 28
End: 2023-05-08

## 2023-05-08 ENCOUNTER — TELEPHONE (OUTPATIENT)
Dept: INTERNAL MEDICINE | Facility: CLINIC | Age: 28
End: 2023-05-08
Payer: COMMERCIAL

## 2023-05-08 NOTE — TELEPHONE ENCOUNTER
"Pt called in the office today stating that she has started Topamax and was now having worsening suicidal thoughts. She was transferred to me for further triage and evaluation. She states that she started Topamax 6 days ago and has had worsening suicidal thoughts. She states that she does have a h/o depression but hasn't been on medication in 12-13 years. She states that she \"wished she didn't exist\". She states that yesterday she did make a plan and was ready to act this plan out but stopped herself. She states that she still has the plan and the ability to act it out today but doesn't feel as strong about completing the plan. She states that she did notice herself getting more angry once starting the medication. I was able to have her call her sister Kathy and we did a three way call to discuss the situation. Kathy and pt were agreeable to having Sarah taken to the [facility] for an evaluation/treatment of these symptoms. Informed both Kathy and pt that it was very important for her to be evaluated even if these symptoms are related to the start of the topamax. Spoke with EFE Longoria (provider who started medication) and she was agreeable to having the patient evaluated in this context for symptoms.   "

## 2023-05-11 NOTE — TELEPHONE ENCOUNTER
Called and spoke with pt, she states that she is better since speaking with us. She did go to the [facility] for evaluation and stopped the topamax. She is doing well and states that she is in a better state of mind now.

## 2023-05-23 ENCOUNTER — OFFICE VISIT (OUTPATIENT)
Dept: INTERNAL MEDICINE | Facility: CLINIC | Age: 28
End: 2023-05-23
Payer: COMMERCIAL

## 2023-05-23 VITALS
OXYGEN SATURATION: 99 % | BODY MASS INDEX: 38.38 KG/M2 | SYSTOLIC BLOOD PRESSURE: 114 MMHG | HEIGHT: 66 IN | WEIGHT: 238.8 LBS | HEART RATE: 66 BPM | RESPIRATION RATE: 16 BRPM | TEMPERATURE: 97.3 F | DIASTOLIC BLOOD PRESSURE: 70 MMHG

## 2023-05-23 DIAGNOSIS — Z91.89 AT RISK FOR SLEEP APNEA: ICD-10-CM

## 2023-05-23 DIAGNOSIS — G43.709 CHRONIC MIGRAINE WITHOUT AURA WITHOUT STATUS MIGRAINOSUS, NOT INTRACTABLE: Primary | ICD-10-CM

## 2023-05-23 PROCEDURE — 99213 OFFICE O/P EST LOW 20 MIN: CPT | Performed by: NURSE PRACTITIONER

## 2023-05-23 RX ORDER — SUMATRIPTAN 25 MG/1
TABLET, FILM COATED ORAL
Qty: 9 TABLET | Refills: 1 | Status: SHIPPED | OUTPATIENT
Start: 2023-05-23

## 2023-05-23 NOTE — PROGRESS NOTES
"Subjective   Sarah Giron is a 27 y.o. female.     Chief Complaint   Patient presents with   • Migraine     4 week f/u, pt has MRI scheduled for 5/30/2023       PCP: Amanda Carreno MD    History of Present Illness    Patient presents in clinic today for follow-up on migraines. Reports that while she was taking topiramate she experienced suicidal ideations and discontinued. She has an MRI scheduled for 05/30/2023. An adult female (sister) accompanies the patient.     The patient reports that she has a mild headache today. She rates her headache today a three out of ten for pain. Reports that while taking topiramate she felt aggravated, irritated daily, and wondered if \"things would be better if she was not around\". She reports that her sleep has improved. She states that she felt like every positive effect had worn off. Reports that it did not give her any relief and she still experienced headaches daily. The patient reports that her suicidal ideations were so severe \"that she would go to the bathroom and thought about going through with it.\" Reports that her brother needed to use the restroom, so she went to bed.  Reports as the topiramate wore off she realized that she did not feel suicidal anymore.  Reports to have had an evaluation and agreed to discontinue the topiramate. She reports that she has not had suicidal thoughts since. Reports that she has not taken sumatriptan since the last time she was seen. She reports taking Ibuprofen twice a week for severe headaches. The patient states that she is unsure what triggers her migraines. She reports that caffeine usually helps her with headaches.     She reports that normally when she is depressed, she has only gone as far as \"wishing she did not exist.\" She has never actually found herself wanting to follow through with it.     The patient reports that she is unsure if she snores. The adult female reports that she has never heard the patient snore but has " "heard her talk in her sleep. The adult female reports that she has not noticed if the patient gasp for air, stop breathing or startle herself awake.  Sarah reports that she does not feel refreshed in the morning. The patient reports that if she intakes too much food it's easier for her to fall asleep. She reports that in the mornings she feels \"groggy\" and usually takes an hour or more for her to feel refreshed.     - Father and sister working with neurologist for migraines:   -Sleep apnea- reports that her father and her maternal grandfather have it.     The following portions of the patient's history were reviewed and updated as appropriate: allergies, current medications, past family history, past medical history, past social history, past surgical history and problem list.        Review of Systems   Constitutional: Negative for fatigue, fever and unexpected weight loss.   Eyes: Negative for blurred vision, double vision and visual disturbance.   Respiratory: Negative for cough, shortness of breath and wheezing.    Cardiovascular: Negative for chest pain, palpitations and leg swelling.   Gastrointestinal: Negative for abdominal pain, constipation, diarrhea, nausea and vomiting.   Genitourinary: Negative for difficulty urinating, frequency and urgency.   Musculoskeletal: Negative for arthralgias and myalgias.   Skin: Negative for color change and rash.   Neurological: Positive for headache. Negative for dizziness and weakness.   Hematological: Negative for adenopathy. Does not bruise/bleed easily.           Outpatient Medications Marked as Taking for the 5/23/23 encounter (Office Visit) with Pam Gray APRN   Medication Sig Dispense Refill   • SUMAtriptan (Imitrex) 25 MG tablet Take one tablet at onset of headache. May repeat dose one time in 2 hours if headache not relieved. 9 tablet 1     Allergies   Allergen Reactions   • Topiramate Other (See Comments)     SI           Objective   Physical " "Exam  Constitutional:       Appearance: Normal appearance. She is well-developed.   HENT:      Head: Normocephalic and atraumatic.   Eyes:      General: No scleral icterus.     Conjunctiva/sclera: Conjunctivae normal.   Cardiovascular:      Rate and Rhythm: Normal rate and regular rhythm.      Heart sounds: Normal heart sounds.   Pulmonary:      Effort: Pulmonary effort is normal. No respiratory distress.      Breath sounds: Normal breath sounds.   Abdominal:      General: Bowel sounds are normal.      Palpations: Abdomen is soft.      Tenderness: There is no abdominal tenderness.   Musculoskeletal:         General: Normal range of motion.      Cervical back: Normal range of motion.      Right lower leg: No edema.      Left lower leg: No edema.   Skin:     General: Skin is warm and dry.   Neurological:      General: No focal deficit present.      Mental Status: She is alert and oriented to person, place, and time.   Psychiatric:         Attention and Perception: Attention normal.         Mood and Affect: Mood and affect normal.         Behavior: Behavior normal. Behavior is cooperative.         Thought Content: Thought content normal.         Cognition and Memory: Cognition normal.         Judgment: Judgment normal.         Vitals:    05/23/23 1516   BP: 114/70   BP Location: Right arm   Patient Position: Sitting   Cuff Size: Adult   Pulse: 66   Resp: 16   Temp: 97.3 °F (36.3 °C)   TempSrc: Infrared   SpO2: 99%   Weight: 108 kg (238 lb 12.8 oz)   Height: 167.6 cm (66\")   PainSc:   3   PainLoc: Head     Body mass index is 38.54 kg/m².  Wt Readings from Last 3 Encounters:   05/23/23 108 kg (238 lb 12.8 oz)   04/26/23 109 kg (241 lb)   01/17/23 109 kg (240 lb)             Assessment & Plan   Diagnoses and all orders for this visit:    1. Chronic migraine without aura without status migrainosus, not intractable (Primary)  -     SUMAtriptan (Imitrex) 25 MG tablet; Take one tablet at onset of headache. May repeat dose one " time in 2 hours if headache not relieved.  Dispense: 9 tablet; Refill: 1  -     Ambulatory Referral to Neurology    2. At risk for sleep apnea  -     Ambulatory Referral to Sleep Medicine    migraines  - MRI on 05/30/2023  -referral for neurology  - refill on sumatriptan  - requested the patient not to take no more than five ibuprofen a month.   - Begin migraine and headache diary.     sleep apnea  - referral sent to sleep medicine    Return for will notify of MRI results and can FU accordingly.    I discussed my findings,recommendations, and plan of care was with the patient. They verbalized understanding and agreement.  Patient was encouraged to keep me informed of any acute changes, lack of improvement, or any new concerning symptoms.     Transcribed from ambient dictation for EFE Lane by Vicenta Gonsalez .  05/23/23   19:18 EDT    Patient or patient representative verbalized consent to the visit recording.  I have personally performed the services described in this document as transcribed by the above individual, and it is both accurate and complete.  EFE Lane  5/23/2023  20:12 EDT

## 2023-05-30 ENCOUNTER — HOSPITAL ENCOUNTER (OUTPATIENT)
Dept: MRI IMAGING | Facility: HOSPITAL | Age: 28
Discharge: HOME | End: 2023-05-30
Admitting: NURSE PRACTITIONER

## 2023-05-30 DIAGNOSIS — R42 DIZZINESS: ICD-10-CM

## 2023-05-30 DIAGNOSIS — G43.709 CHRONIC MIGRAINE WITHOUT AURA WITHOUT STATUS MIGRAINOSUS, NOT INTRACTABLE: ICD-10-CM

## 2023-05-30 DIAGNOSIS — R51.9 CHRONIC DAILY HEADACHE: ICD-10-CM

## 2023-05-30 PROCEDURE — 70551 MRI BRAIN STEM W/O DYE: CPT

## 2023-08-25 ENCOUNTER — OFFICE VISIT (OUTPATIENT)
Dept: NEUROLOGY | Facility: CLINIC | Age: 28
End: 2023-08-25
Payer: COMMERCIAL

## 2023-08-25 ENCOUNTER — PATIENT MESSAGE (OUTPATIENT)
Dept: NEUROLOGY | Facility: CLINIC | Age: 28
End: 2023-08-25

## 2023-08-25 VITALS
BODY MASS INDEX: 38.09 KG/M2 | OXYGEN SATURATION: 98 % | HEIGHT: 66 IN | SYSTOLIC BLOOD PRESSURE: 122 MMHG | HEART RATE: 64 BPM | WEIGHT: 237 LBS | DIASTOLIC BLOOD PRESSURE: 82 MMHG

## 2023-08-25 DIAGNOSIS — G43.709 CHRONIC MIGRAINE WITHOUT AURA WITHOUT STATUS MIGRAINOSUS, NOT INTRACTABLE: Primary | ICD-10-CM

## 2023-08-25 PROBLEM — G43.C0 PERIODIC HEADACHE SYNDROME, NOT INTRACTABLE: Status: ACTIVE | Noted: 2023-08-25

## 2023-08-25 PROBLEM — G43.C0 PERIODIC HEADACHE SYNDROME, NOT INTRACTABLE: Chronic | Status: ACTIVE | Noted: 2023-08-25

## 2023-08-25 PROCEDURE — 99204 OFFICE O/P NEW MOD 45 MIN: CPT | Performed by: PSYCHIATRY & NEUROLOGY

## 2023-08-25 NOTE — PROGRESS NOTES
Subjective   Patient ID: Sarah Giron is a 28 y.o. female     Chief Complaint   Patient presents with    Migraine        History of Present Illness    28 y.o. female referred by Pam WILKS for migraines.     HA are daily and constant.  Present for years.  Intensity 4 - 6.  Located in forehead and behind OD.  Worse in the evenings.      Preventative:  TPM  Abortive:  Imitrex not helpful    MRI Brain, my review of films, 23 normal      Reviewed medical records:    IBU twice a week for HA.  Assoc sx of dizziness,   HA frequency is daily.  Present for last 8 years.  Located OD.      TPM caused SI.      Past Medical History:   Diagnosis Date    Bell palsy     Cholelithiasis 2010    Gallbladder removed in Dec. 2010    Depression 2010    Fracture of wrist 2006    Headache     Headache, tension-type 2012    Migraine 2012    Nexplanon in place      over 8 years ago as of 2023     Family History   Problem Relation Age of Onset    Diabetes Mother     Hypertension Mother     Depression Mother     Hyperlipidemia Mother     Migraine headaches Father     Migraines Father     Diabetes Sister     Dislocations Sister         Knee    Migraine headaches Sister     Lupus Sister     Migraines Sister         Migraines since teen    Asthma Brother     Broken bones Brother     Migraine headaches Maternal Grandmother     Migraines Maternal Grandmother     Diabetes Maternal Grandfather     Broken bones Maternal Grandfather     Alcohol abuse Paternal Grandmother     Alcohol abuse Paternal Grandfather      Social History     Socioeconomic History    Marital status: Single   Tobacco Use    Smoking status: Never     Passive exposure: Never    Smokeless tobacco: Never   Vaping Use    Vaping Use: Never used   Substance and Sexual Activity    Alcohol use: Yes     Comment: Only on special occasions    Drug use: Never    Sexual activity: Not Currently     Partners: Male     Birth control/protection: Abstinence  "      Review of Systems   Constitutional:  Negative for activity change, fatigue and unexpected weight change.   HENT:  Negative for tinnitus and trouble swallowing.    Eyes:  Positive for photophobia. Negative for visual disturbance.   Respiratory:  Negative for apnea, cough and choking.    Cardiovascular:  Negative for leg swelling.   Gastrointestinal:  Negative for nausea and vomiting.   Endocrine: Negative for cold intolerance and heat intolerance.   Genitourinary:  Negative for difficulty urinating, frequency, menstrual problem and urgency.   Musculoskeletal:  Negative for back pain, gait problem, myalgias and neck pain.   Skin:  Negative for color change and rash.   Allergic/Immunologic: Negative for immunocompromised state.   Neurological:  Positive for dizziness and headaches. Negative for tremors, seizures, syncope, facial asymmetry, speech difficulty, weakness, light-headedness and numbness.   Hematological:  Negative for adenopathy. Does not bruise/bleed easily.   Psychiatric/Behavioral:  Positive for dysphoric mood. Negative for behavioral problems, confusion, decreased concentration, hallucinations and sleep disturbance. The patient is nervous/anxious.      Objective     Vitals:    08/25/23 1451   BP: 122/82   Pulse: 64   SpO2: 98%   Weight: 108 kg (237 lb)   Height: 167.6 cm (65.98\")       Neurologic Exam     Mental Status   Oriented to person, place, and time.   Speech: speech is normal   Level of consciousness: alert  Knowledge: good and consistent with education.   Normal comprehension.     Cranial Nerves   Cranial nerves II through XII intact.     CN II   Visual fields full to confrontation.   Visual acuity: normal  Right visual field deficit: none  Left visual field deficit: none     CN III, IV, VI   Pupils are equal, round, and reactive to light.  Extraocular motions are normal.   Nystagmus: none   Diplopia: none  Ophthalmoparesis: none  Upgaze: normal  Downgaze: normal  Conjugate gaze: " present    CN V   Facial sensation intact.   Right corneal reflex: normal  Left corneal reflex: normal    CN VII   Right facial weakness: none  Left facial weakness: none    CN VIII   Hearing: intact    CN IX, X   Palate: symmetric  Right gag reflex: normal  Left gag reflex: normal    CN XI   Right sternocleidomastoid strength: normal  Left sternocleidomastoid strength: normal    CN XII   Tongue: not atrophic  Fasciculations: absent  Tongue deviation: none    Motor Exam   Muscle bulk: normal  Overall muscle tone: normal    Strength   Strength 5/5 throughout.     Sensory Exam   Light touch normal.     Gait, Coordination, and Reflexes     Gait  Gait: normal    Tremor   Resting tremor: absent  Intention tremor: absent  Action tremor: absent    Reflexes   Reflexes 2+ except as noted.     Physical Exam  Eyes:      Extraocular Movements: EOM normal.      Pupils: Pupils are equal, round, and reactive to light.   Neurological:      Mental Status: She is oriented to person, place, and time.      Cranial Nerves: Cranial nerves 2-12 are intact.      Motor: Motor strength is normal.      Gait: Gait is intact.   Psychiatric:         Speech: Speech normal.       Office Visit on 2023   Component Date Value Ref Range Status    Reference Lab Report 2023    Final                    Value:[facility]  [address] [place] [address]  Phone: [phone] or [phone]  Fax: [phone]  Nba Quijano M.D., Medical Director    PATIENT NAME                                     LABORATORY NO.  JHONATAN ROSADO                              [id number]  [id number]                                 AGE                    SEX   SSN              CLIENT REF #  [facility]                  27        1995      F     [id number]      [id number]    [address] [facility] [address]            REQUESTING M.D.           ATTENDING [place] [address]                         NGOZI TRAMMELL  DATE COLLECTED            DATE RECEIVED          DATE REPORTED  01/17/2023 01/17/2023 01/19/2023    ThinPrep Pap with [facility]    DIAGNOSIS:  Negative for intraepithelial lesion or malignancy    Multiple factors can influence accuracy of Pap tests; therefore, screening at  regular                           intervals is necessary for early cancer detection.      SPECIMEN ADEQUACY:  SATISFACTORY FOR EVALUATION  Transformation zone is present.  SOURCE OF SPECIMEN:       CERVICAL/ENDOCERVICAL  SLIDES:  1  CLINICAL HISTORY:  Pap test, as part of routine gynecological examination      CYTOTECHNOLOGIST:             MARK FUENTES (ASCP)    CPT CODES:  89438           Assessment & Plan     Problem List Items Addressed This Visit          Neuro    Chronic migraine without aura without status migrainosus, not intractable - Primary    Current Assessment & Plan     Headaches are worsening.  Medication changes per orders.    Start Qulipta and Ubrevly              Relevant Medications    aspirin-acetaminophen-caffeine (EXCEDRIN MIGRAINE) 250-250-65 MG per tablet          No follow-ups on file.

## 2023-08-28 NOTE — TELEPHONE ENCOUNTER
From: Sarah Giron  To: Jung Mccallum  Sent: 8/25/2023 8:28 PM EDT  Subject: New Medications    Hello,     I have reviewed my after visit summary and noticed that although the sumatriptan was removed, it does not appear that the Ubrelvy and Qulipta were placed as prescriptions. I just wanted to confirm if I am to be receiving these as scripts.     Thank you!

## 2023-08-29 ENCOUNTER — SPECIALTY PHARMACY (OUTPATIENT)
Dept: ONCOLOGY | Facility: HOSPITAL | Age: 28
End: 2023-08-29
Payer: COMMERCIAL

## 2023-08-29 RX ORDER — ATOGEPANT 60 MG/1
60 TABLET ORAL DAILY
Qty: 30 TABLET | Refills: 11 | Status: SHIPPED | OUTPATIENT
Start: 2023-08-29 | End: 2023-08-31

## 2023-08-30 RX ORDER — ATOGEPANT 60 MG/1
60 TABLET ORAL DAILY
Qty: 12 TABLET | Refills: 0 | COMMUNITY
Start: 2023-08-30

## 2023-08-31 ENCOUNTER — TELEPHONE (OUTPATIENT)
Dept: NEUROLOGY | Facility: CLINIC | Age: 28
End: 2023-08-31
Payer: COMMERCIAL

## 2023-08-31 RX ORDER — RIZATRIPTAN BENZOATE 10 MG/1
10 TABLET ORAL ONCE AS NEEDED
Qty: 12 TABLET | Refills: 3 | Status: SHIPPED | OUTPATIENT
Start: 2023-08-31 | End: 2024-08-30

## 2023-08-31 RX ORDER — AMITRIPTYLINE HYDROCHLORIDE 10 MG/1
10-20 TABLET, FILM COATED ORAL NIGHTLY
Qty: 60 TABLET | Refills: 11 | Status: SHIPPED | OUTPATIENT
Start: 2023-08-31 | End: 2024-08-30

## 2023-08-31 NOTE — TELEPHONE ENCOUNTER
Spoke to patient.  Explained that her insurance would not approve the qulipta and emgality without having failed other medications first.  Informed that Dr Mccallum has sent in Elavil and Maxalt for her to try.  Explained that if they worked that would be amazing but if not then we could revisit the PA's for the other meds.  She expressed appreciation and understanding.

## 2023-08-31 NOTE — TELEPHONE ENCOUNTER
----- Message from Pam Hewitt, Pharmacy Technician sent at 8/31/2023 10:00 AM EDT -----  Her PA was denied on the Qulipta. I know she has tried topamax but I think she will have to try at least 1 more preventative before they will approve it. I am still waiting on a response about Ubrelvy but she has only tried imitrex so Im thinking it will likely be denied. I think she will need to try another triptan as well.     ----- Message -----  From: Jung Mccallum MD  Sent: 8/25/2023   3:09 PM EDT  To: [facility]    New Qulipta and Ubrevly start

## 2023-09-07 NOTE — TELEPHONE ENCOUNTER
Called patient to inform that per Dr Mccallum the medication should help improve mood.  As with any medication, the side effects vary.  If she has any issues with it to call or message back.  She expressed appreciation.

## 2023-09-25 ENCOUNTER — OFFICE VISIT (OUTPATIENT)
Dept: SLEEP MEDICINE | Facility: CLINIC | Age: 28
End: 2023-09-25

## 2023-09-25 VITALS
SYSTOLIC BLOOD PRESSURE: 108 MMHG | HEIGHT: 66 IN | DIASTOLIC BLOOD PRESSURE: 78 MMHG | OXYGEN SATURATION: 99 % | TEMPERATURE: 97.8 F | BODY MASS INDEX: 37.93 KG/M2 | HEART RATE: 57 BPM | WEIGHT: 236 LBS

## 2023-09-25 DIAGNOSIS — R51.9 MORNING HEADACHE: ICD-10-CM

## 2023-09-25 DIAGNOSIS — R41.840 LACK OF CONCENTRATION: ICD-10-CM

## 2023-09-25 DIAGNOSIS — G47.10 EXCESSIVE SLEEPINESS: ICD-10-CM

## 2023-09-25 DIAGNOSIS — R29.818 SUSPECTED SLEEP APNEA: Primary | ICD-10-CM

## 2023-09-25 PROCEDURE — 99213 OFFICE O/P EST LOW 20 MIN: CPT | Performed by: NURSE PRACTITIONER

## 2023-09-25 NOTE — PROGRESS NOTES
"Chief Complaint:   Chief Complaint   Patient presents with    Sleeping Problem       HPI:    Sarah Giron is a 28 y.o. female here to establish care.  Patient sees Amanda Carreno MD and EFE Longoria for care.  Patient has a history of migraine headaches and suspected sleep apnea.    Patient does have a 4 to 5-year history of light snoring, excessive daytime sleepiness, frequent awakenings, morning headaches, nonrestorative sleep, memory loss, and lack of concentration.  Patient states \"I do not know what my life would be like without a headache.\"  She states the daytime sleepiness and grogginess is making it hard for her to concentrate at work.  Patient does deny nasal fracture.  Possible history of concussion.  Patient states when she is half asleep and half awake she will hear what she describes as other people's conversations.  She has no history of sleep paralysis.    Weekday she does go to bed anywhere from 11 PM to 12 AM and awaken 7:45 AM.  Weekend going to bed anywhere from 11 PM to 12 AM and awakening at 11 AM.  She does estimate getting 8 hours of sleep nightly.  She will go to sleep within 30 to 60 minutes.  Patient states she is very restless during the night, constantly tosses and turns and awakens frequently.    We did speak today about the consequences of untreated sleep apnea such as hypertension, atrial fibrillation, stroke, early onset dementia and sudden cardiac death.  We also discussed different therapies available to her such as CPAP, MAD, or ENT referral.  Patient verbalizes understanding.    Social history  This is a very pleasant 28-year-old female.  Patient is a non-smoker, nondrinker and denies illicit substance use.  Patient will have an occasional tea other than that her main intake is water.    Past Medical History:   Diagnosis Date    Bell palsy     Cholelithiasis 11/2010    Gallbladder removed in Dec. 2010    Depression 04/2010    Fracture of wrist 06/2006    Headache     " Headache, tension-type 2012    Migraine 2012    Nexplanon in place      over 8 years ago as of 2023       Family History   Problem Relation Age of Onset    Obesity Mother     Diabetes Mother     Hypertension Mother     Depression Mother     Hyperlipidemia Mother     Sleep apnea Father     Migraine headaches Father     Migraines Father     Obesity Sister     Diabetes Sister     Dislocations Sister         Knee    Obesity Sister     Migraine headaches Sister     Lupus Sister     Migraines Sister         Migraines since teen    Obesity Brother     Asthma Brother     Broken bones Brother     Chronic bronchitis Maternal Grandmother     COPD Maternal Grandmother     Migraine headaches Maternal Grandmother     Migraines Maternal Grandmother     Sleep apnea Maternal Grandfather     Obesity Maternal Grandfather     Diabetes Maternal Grandfather     Broken bones Maternal Grandfather     Alcohol abuse Paternal Grandmother     Alcohol abuse Paternal Grandfather      Past Surgical History:   Procedure Laterality Date    CHOLECYSTECTOMY  2010           Current medications are:   Current Outpatient Medications:     amitriptyline (ELAVIL) 10 MG tablet, Take 1-2 tablets by mouth Every Night., Disp: 60 tablet, Rfl: 11    aspirin-acetaminophen-caffeine (EXCEDRIN MIGRAINE) 250-250-65 MG per tablet, Take 1 tablet by mouth As Needed for Headache., Disp: , Rfl:     rizatriptan (Maxalt) 10 MG tablet, Take 1 tablet by mouth 1 (One) Time As Needed for Migraine. May repeat in 2 hours if needed, Disp: 12 tablet, Rfl: 3.      The patient's relevant past medical, surgical, family and social history were reviewed and updated in Epic as appropriate.       Review of Systems   Constitutional:  Positive for fatigue.   Neurological:  Positive for headaches.   Psychiatric/Behavioral:  Positive for dysphoric mood and sleep disturbance. The patient is nervous/anxious.    All other systems reviewed and are negative.      Objective:    Physical  Exam  Constitutional:       Appearance: Normal appearance.   HENT:      Head: Normocephalic and atraumatic.      Mouth/Throat:      Mouth: Mucous membranes are moist.      Pharynx: Oropharynx is clear.   Cardiovascular:      Rate and Rhythm: Normal rate and regular rhythm.   Pulmonary:      Effort: Pulmonary effort is normal.      Breath sounds: Normal breath sounds.   Skin:     General: Skin is warm and dry.   Neurological:      Mental Status: She is alert and oriented to person, place, and time.   Psychiatric:         Mood and Affect: Mood normal.         Behavior: Behavior normal.         Thought Content: Thought content normal.         Judgment: Judgment normal.         ASSESSMENT/PLAN    Diagnoses and all orders for this visit:    1. Suspected sleep apnea (Primary)  -     Home Sleep Study; Future    2. Morning headache  -     Home Sleep Study; Future    3. Excessive sleepiness  -     Home Sleep Study; Future    4. Lack of concentration  -     Home Sleep Study; Future        Counseled patient regarding multimodal approach with healthy nutrition, healthy sleep, regular physical activity, social activities, counseling, and medications. Encouraged to practice lateral sleep position. Avoid alcohol and sedatives close to bedtime.    Due to such a strong story for sleep apnea and consequences of untreated sleep apnea we will move forward with HST and follow-up as appropriate.    I have reviewed the results of my evaluation and impression and discussed my recommendations in detail with the patient.      Signed by  EFE Huerta    September 25, 2023      CC: Amanda Carreno MD Brown, Melissa F, APRN

## 2023-10-11 ENCOUNTER — DOCUMENTATION (OUTPATIENT)
Dept: ONCOLOGY | Facility: HOSPITAL | Age: 28
End: 2023-10-11
Payer: COMMERCIAL

## 2023-10-20 ENCOUNTER — HOSPITAL ENCOUNTER (OUTPATIENT)
Dept: SLEEP MEDICINE | Facility: HOSPITAL | Age: 28
Discharge: HOME | End: 2023-10-20
Admitting: NURSE PRACTITIONER
Payer: COMMERCIAL

## 2023-10-20 VITALS — BODY MASS INDEX: 37.93 KG/M2 | WEIGHT: 236 LBS | HEIGHT: 66 IN

## 2023-10-20 DIAGNOSIS — R29.818 SUSPECTED SLEEP APNEA: ICD-10-CM

## 2023-10-20 DIAGNOSIS — G47.10 EXCESSIVE SLEEPINESS: ICD-10-CM

## 2023-10-20 DIAGNOSIS — R41.840 LACK OF CONCENTRATION: ICD-10-CM

## 2023-10-20 DIAGNOSIS — R51.9 MORNING HEADACHE: ICD-10-CM

## 2023-10-20 PROCEDURE — 95800 SLP STDY UNATTENDED: CPT

## 2023-10-24 DIAGNOSIS — R51.9 MORNING HEADACHE: ICD-10-CM

## 2023-10-24 DIAGNOSIS — G47.33 OSA (OBSTRUCTIVE SLEEP APNEA): Primary | ICD-10-CM

## 2024-01-04 ENCOUNTER — OFFICE VISIT (OUTPATIENT)
Dept: NEUROLOGY | Facility: CLINIC | Age: 29
End: 2024-01-04
Payer: COMMERCIAL

## 2024-01-04 VITALS
OXYGEN SATURATION: 99 % | HEIGHT: 66 IN | HEART RATE: 76 BPM | DIASTOLIC BLOOD PRESSURE: 78 MMHG | BODY MASS INDEX: 38.89 KG/M2 | WEIGHT: 242 LBS | SYSTOLIC BLOOD PRESSURE: 122 MMHG

## 2024-01-04 DIAGNOSIS — G43.709 CHRONIC MIGRAINE WITHOUT AURA WITHOUT STATUS MIGRAINOSUS, NOT INTRACTABLE: Primary | ICD-10-CM

## 2024-01-04 PROCEDURE — 99214 OFFICE O/P EST MOD 30 MIN: CPT | Performed by: PSYCHIATRY & NEUROLOGY

## 2024-01-04 NOTE — ASSESSMENT & PLAN NOTE
Headaches are worsening.  Medication changes per orders.    Continue Elavil     Start Qulipta and Ubrevly

## 2024-01-04 NOTE — PROGRESS NOTES
"Chief Complaint  Migraine    Subjective        Sarah Giron presents to [facility] NEUROLOGY  History of Present Illness    28 y.o. female returns in follow up.  Last visit on 8/25/23 rx Qulipta and Ubrevly.      Called in Elavil and Maxalt.      Anxiety improved on Elavil.  Cannot increase as sleeps 10 - 12 hours with 20 mg qhs.      Ubrevly did not try.    HA are daily and constant.  Present for years.  Intensity 2 - 4.  Located in forehead and behind OD.  Worse in the evenings.       Preventative:  TPM, Elavil   Abortive:  Imitrex, Maxalt not helpful     MRI Brain, my review of films, 5/30/23 normal       Reviewed medical records:     IBU twice a week for HA.  Assoc sx of dizziness,   HA frequency is daily.  Present for last 8 years.  Located OD.       TPM caused SI.     Objective   Vital Signs:  /78   Pulse 76   Ht 167.6 cm (65.98\")   Wt 110 kg (242 lb)   SpO2 99%   BMI 39.08 kg/m²   Estimated body mass index is 39.08 kg/m² as calculated from the following:    Height as of this encounter: 167.6 cm (65.98\").    Weight as of this encounter: 110 kg (242 lb).           Neurologic Exam     Mental Status   Oriented to person, place, and time.   Speech: speech is normal   Level of consciousness: alert  Knowledge: good and consistent with education.   Normal comprehension.     Cranial Nerves   Cranial nerves II through XII intact.     CN II   Visual fields full to confrontation.   Visual acuity: normal  Right visual field deficit: none  Left visual field deficit: none     CN III, IV, VI   Pupils are equal, round, and reactive to light.  Extraocular motions are normal.   Nystagmus: none   Diplopia: none  Ophthalmoparesis: none  Upgaze: normal  Downgaze: normal  Conjugate gaze: present    CN V   Facial sensation intact.   Right corneal reflex: normal  Left corneal reflex: normal    CN VII   Right facial weakness: none  Left facial weakness: none    CN VIII   Hearing: intact    CN IX, X "   Palate: symmetric  Right gag reflex: normal  Left gag reflex: normal    CN XI   Right sternocleidomastoid strength: normal  Left sternocleidomastoid strength: normal    CN XII   Tongue: not atrophic  Fasciculations: absent  Tongue deviation: none    Motor Exam   Muscle bulk: normal  Overall muscle tone: normal    Strength   Strength 5/5 throughout.     Sensory Exam   Light touch normal.     Gait, Coordination, and Reflexes     Gait  Gait: normal    Tremor   Resting tremor: absent  Intention tremor: absent  Action tremor: absent    Reflexes   Reflexes 2+ except as noted.        Physical Exam  Eyes:      Extraocular Movements: EOM normal.      Pupils: Pupils are equal, round, and reactive to light.   Neurological:      Mental Status: She is oriented to person, place, and time.      Cranial Nerves: Cranial nerves 2-12 are intact.      Motor: Motor strength is normal.     Gait: Gait is intact.   Psychiatric:         Speech: Speech normal.        Result Review :  The following data was reviewed by: Jung Mccallum MD on 01/04/2024:                 Assessment and Plan   Diagnoses and all orders for this visit:    1. Chronic migraine without aura without status migrainosus, not intractable (Primary)  Assessment & Plan:  Headaches are worsening.  Medication changes per orders.    Continue Elavil     Start Qulipta and Ubrevly                    Follow Up   No follow-ups on file.  Patient was given instructions and counseling regarding her condition or for health maintenance advice. Please see specific information pulled into the AVS if appropriate.

## 2024-01-16 ENCOUNTER — SPECIALTY PHARMACY (OUTPATIENT)
Dept: ONCOLOGY | Facility: HOSPITAL | Age: 29
End: 2024-01-16
Payer: COMMERCIAL

## 2024-01-16 RX ORDER — ATOGEPANT 60 MG/1
60 TABLET ORAL DAILY
Qty: 30 TABLET | Refills: 11 | Status: SHIPPED | OUTPATIENT
Start: 2024-01-16

## 2024-01-16 NOTE — PROGRESS NOTES
"Subjective   Chief Complaint   Patient presents with    Annual Exam     No complaints     Sarah Giron is a 28 y.o. year old  presenting to be seen for her annual exam.  She is doing well, and has no complaints.    Last pap 2023: NILM  S/p HPV vaccine     SEXUAL Hx:  She is not currently sexually active.  She would not like to be screened for STD's at today's exam.  Current birth control method: Abstinence  She is happy with her current method of contraception and does not want to discuss alternative methods of contraception.  MENSTRUAL Hx:  Patient's last menstrual period was 2024 (exact date).  In the past 6 months her cycles have been regular, predictable and occur monthly.  Her menstrual flow is typically normal.   Each month on average there are roughly 2 day(s) of very heavy flow. She does have some clotting every couple of periods.   Intermenstrual bleeding is absent.    Post-coital bleeding is n/a.  Dysmenorrhea: mild  PMS: none   Her cycles are not a source of concern for her that she wishes to discuss today.  HEALTH Hx:  She wears her seat belt: yes.  She has concerns about domestic violence: no.    The following portions of the patient's history were reviewed and updated as appropriate:problem list, current medications, allergies, past family history, past medical history, past social history, and past surgical history.    Social History    Tobacco Use      Smoking status: Never      Smokeless tobacco: Never         Objective   /86 (BP Location: Right arm, Patient Position: Sitting, Cuff Size: Adult)   Resp 14   Ht 167.6 cm (66\")   Wt 108 kg (238 lb)   LMP 2024 (Exact Date)   Breastfeeding No   BMI 38.41 kg/m²     General:  well developed; well nourished  no acute distress  obese - Body mass index is 38.41 kg/m².   Skin:  No suspicious lesions seen   Thyroid: not examined   Breasts:  Examined in supine position  Symmetric without masses or skin dimpling  Nipples " normal without inversion, lesions or discharge  There are no palpable axillary nodes   Pelvis: Clinical staff was present for exam  External genitalia:  normal appearance of the external genitalia including Bartholin's and Smithers's glands.  :  urethral meatus normal;  Vaginal:  normal pink mucosa without prolapse or lesions.  Cervix:  normal appearance. ectropian present;  Uterus:  normal size, shape and consistency.  Adnexa:  non palpable bilaterally.  Rectal:  digital rectal exam not performed; anus visually normal appearing.        Assessment   Normal GYN exam     Plan   Pap was not done today.  I explained to Sarah that the recommendations for Pap smear interval in a low risk patient has lengthened to 3 years time.  I told Sarah she still needs to be seen in our office yearly for a full physical including breast and pelvic exam.   Patient to call the future should her periods ever become heavy or bothersome for contraception counseling.  Patient voiced understanding.  The importance of keeping all planned follow-up and taking all medications as prescribed was emphasized.  Follow up for annual exam in 1 year or sooner PRN      No orders of the defined types were placed in this encounter.         This note was electronically signed.    Jane Ramírez MD   January 17, 2024

## 2024-01-17 ENCOUNTER — OFFICE VISIT (OUTPATIENT)
Dept: OBSTETRICS AND GYNECOLOGY | Facility: CLINIC | Age: 29
End: 2024-01-17
Payer: COMMERCIAL

## 2024-01-17 VITALS
BODY MASS INDEX: 38.25 KG/M2 | WEIGHT: 238 LBS | HEIGHT: 66 IN | DIASTOLIC BLOOD PRESSURE: 86 MMHG | SYSTOLIC BLOOD PRESSURE: 122 MMHG | RESPIRATION RATE: 14 BRPM

## 2024-01-17 DIAGNOSIS — Z01.419 WOMEN'S ANNUAL ROUTINE GYNECOLOGICAL EXAMINATION: Primary | ICD-10-CM

## 2024-02-01 ENCOUNTER — SPECIALTY PHARMACY (OUTPATIENT)
Dept: ONCOLOGY | Facility: HOSPITAL | Age: 29
End: 2024-02-01
Payer: COMMERCIAL

## 2024-06-24 ENCOUNTER — PATIENT MESSAGE (OUTPATIENT)
Dept: NEUROLOGY | Facility: CLINIC | Age: 29
End: 2024-06-24
Payer: COMMERCIAL

## 2024-06-25 NOTE — TELEPHONE ENCOUNTER
From: Sarah Giron  To: Jung Mccallum  Sent: 6/24/2024 4:59 PM EDT  Subject: Migraine Inquiry    Good afternoon,     I wanted to reach out because I awoke in the middle of the night last night to realize that I had a migraine. I did have a headache yesterday but felt better by the time I went to bed. However, I awoke briefly and was seeing an aura before I was able to go back to sleep. I wanted to confirm if this is something normal with migraines. I have often experienced head pain overnight but sleep does typically help ease it. Is it common for migraines to start or worsen during sleep?    Thank you,   Sarah

## 2024-06-28 ENCOUNTER — SPECIALTY PHARMACY (OUTPATIENT)
Dept: ONCOLOGY | Facility: HOSPITAL | Age: 29
End: 2024-06-28
Payer: COMMERCIAL

## 2024-07-09 ENCOUNTER — SPECIALTY PHARMACY (OUTPATIENT)
Dept: ONCOLOGY | Facility: HOSPITAL | Age: 29
End: 2024-07-09
Payer: COMMERCIAL

## 2024-07-24 ENCOUNTER — SPECIALTY PHARMACY (OUTPATIENT)
Dept: ONCOLOGY | Facility: HOSPITAL | Age: 29
End: 2024-07-24
Payer: COMMERCIAL

## 2024-07-26 ENCOUNTER — SPECIALTY PHARMACY (OUTPATIENT)
Dept: ONCOLOGY | Facility: HOSPITAL | Age: 29
End: 2024-07-26
Payer: COMMERCIAL

## 2024-07-26 NOTE — PROGRESS NOTES
Specialty Pharmacy Patient Management Program  Neurology Reassessment     Sarah Giron is a 29 y.o. female with .migraines seen by a Neurology provider and enrolled in the Neurology Patient Management program offered by [facility].  A follow-up outreach was conducted, including assessment of continued therapy appropriateness, medication adherence, and side effect incidence and management for qulilpta 60 mg daily, and ubrelvy 100 mg po prn..     Changes to Insurance Coverage or Financial Support  No change in insurance     Relevant Past Medical History and Comorbidities  Relevant medical history and concomitant health conditions were discussed with the patient. The patient's chart has been reviewed for relevant past medical history and comorbid health conditions and updated as necessary.   Past Medical History:   Diagnosis Date    Bell palsy     Cholelithiasis 2010    Gallbladder removed in Dec. 2010    Depression 2010    Fracture of wrist 2006    Headache     Headache, tension-type 2012    Herpes     Migraine 2012    Movement disorder     Nexplanon in place      over 8 years ago as of 2023    Peripheral neuropathy      Social History     Socioeconomic History    Marital status: Single   Tobacco Use    Smoking status: Never     Passive exposure: Never    Smokeless tobacco: Never   Vaping Use    Vaping status: Never Used   Substance and Sexual Activity    Alcohol use: Yes     Comment: Only on special occasions    Drug use: Never    Sexual activity: Not Currently     Partners: Male     Birth control/protection: Abstinence          Hospitalizations and Urgent Care Since Last Assessment  ED Visits, Admissions, or Hospitalizations: none   Urgent Office Visits: none     Allergies  Known allergies and reactions were discussed with the patient. The patient's chart has been reviewed for allergy information and updated as necessary.   Allergies   Allergen Reactions    Topiramate  Other (See Comments)     SI          Relevant Laboratory Values      Lab Assessment        Current Medication List  This medication list has been reviewed with the patient and evaluated for any interactions or necessary modifications/recommendations, and updated to include all prescription medications, OTC medications, and supplements the patient is currently taking.  This list reflects what is contained in the patient's profile, which has also been marked as reviewed to communicate to other providers it is the most up to date version of the patient's current medication therapy.     Current Outpatient Medications:     amitriptyline (ELAVIL) 10 MG tablet, Take 1-2 tablets by mouth Every Night., Disp: 60 tablet, Rfl: 11    Atogepant (Qulipta) 60 MG tablet, Take 1 tablet by mouth Daily., Disp: 30 tablet, Rfl: 11    ubrogepant (Ubrelvy) 100 MG tablet, Take 1 tablet by mouth Daily As Needed (migraines). Take at onset of headache - May repeat x 1 in 2 hours if needed (max of 200 mg/ 24 hrs), Disp: 16 tablet, Rfl: 6         Drug Interactions  No relevant drug drug interactions with specialty medication. Qulipta is scheduled and ubrelvy is prn       Adverse Drug Reactions  Medication tolerability: Tolerating with no to minimal ADRs          Medication plan: Continue therapy with normal follow-up  Plan for ADR Management: patient to report      Adherence, Self-Administration, and Current Therapy Problems  Adherence related patient's specialty therapy was discussed with the patient. The Adherence segment of this outreach has been reviewed and updated.   Adherence Questions  Linked Medication(s) Assessed: Atogepant, Ubrogepant  On average, how many doses/injections does the patient miss per month?: 0 (the qulipta just recently started about 2 weeks ago)  What are the identified reasons for non-adherence or missed doses? : no problems identified  What is the estimated medication adherence level?: % (ubrelvy is prn)  Based  on the patient/caregiver response and refill history, does this patient require an MTP to track adherence improvements?: no    Additional Barriers to Patient Self-Administration: none  Methods for Supporting Patient Self-Administration: direct education,     Recently Close Medication Therapy Problems  No medication therapy recommendations to display  Open Medication Therapy Problems  No medication therapy recommendations to display     Goals of Therapy  Goals related to the patient's specialty therapy was discussed with the patient. The Patient Goals segment of this outreach has been reviewed and updated.    Goals Addressed Today        Specialty Pharmacy General Goal      To reduce the frequency, severity and duration of migraine headaches.               Quality of Life Assessment   Quality of Life related to the patient's enrollment in the patient management program and services provided was discussed with the patient. The QOL segment of this outreach has been reviewed and updated.   Quality of Life Improvement Scale: 7-Somewhat better    Reassessment Plan & Follow-Up  Medication Therapy Changes: continue with qulipta and ubrelvy  Related Plans, Therapy Recommendations, or Issues to Be Addressed: she has an appointment with Dr. Mccallum next month  Pharmacist to perform regular reassessments no more than (6) months from the previous assessment.  Care Coordinator to set up future refill outreaches, coordinate prescription delivery, and escalate clinical questions to pharmacist.     Attestation  Therapeutic appropriateness: Appropriate  I attest the patient was actively involved in and has agreed to the above plan of care. If the prescribed therapy is at any point deemed not appropriate based on the current or future assessments, a consultation will be initiated with the patient's specialty care provider to determine the best course of action. The revised plan of therapy will be documented along with any additional  patient education provided. Discussed aforementioned material with patient by phone.    Zachary Sawyer, PharmD, BCPS  Clinic Specialty Pharmacist, Neurology  7/26/2024  11:29 EDT

## 2024-07-30 ENCOUNTER — SPECIALTY PHARMACY (OUTPATIENT)
Dept: ONCOLOGY | Facility: HOSPITAL | Age: 29
End: 2024-07-30
Payer: COMMERCIAL

## 2024-08-08 ENCOUNTER — OFFICE VISIT (OUTPATIENT)
Dept: NEUROLOGY | Facility: CLINIC | Age: 29
End: 2024-08-08
Payer: COMMERCIAL

## 2024-08-08 VITALS
WEIGHT: 244 LBS | OXYGEN SATURATION: 98 % | DIASTOLIC BLOOD PRESSURE: 84 MMHG | HEIGHT: 66 IN | BODY MASS INDEX: 39.21 KG/M2 | HEART RATE: 82 BPM | SYSTOLIC BLOOD PRESSURE: 122 MMHG

## 2024-08-08 DIAGNOSIS — G43.709 CHRONIC MIGRAINE WITHOUT AURA WITHOUT STATUS MIGRAINOSUS, NOT INTRACTABLE: Primary | Chronic | ICD-10-CM

## 2024-08-08 PROCEDURE — 99213 OFFICE O/P EST LOW 20 MIN: CPT | Performed by: PSYCHIATRY & NEUROLOGY

## 2024-08-08 RX ORDER — AMITRIPTYLINE HYDROCHLORIDE 10 MG/1
10-20 TABLET, FILM COATED ORAL NIGHTLY
Qty: 180 TABLET | Refills: 3 | Status: SHIPPED | OUTPATIENT
Start: 2024-08-08 | End: 2025-08-08

## 2024-08-08 NOTE — PROGRESS NOTES
"Chief Complaint    Migraine    Subjective        Sarah Giron presents to [facility] NEUROLOGY  History of Present Illness    29 y.o. female returns in follow up.  Last visit on 1/4/24 rx Qulipta and Ubrevly, continued Elavil       Anxiety improved on Elavil.  Cannot increase as sleeps 10 - 12 hours with 20 mg qhs.       Ubrevly effective abortive.     Started Qulipta last month.      HA are two a week.  Present for years.  Intensity 2 - 4.  Located in forehead and behind OD.  Worse in the evenings.       Preventative:  TPM, Elavil   Abortive:  Imitrex, Maxalt not helpful     MRI Brain, 5/30/23 normal       Reviewed medical records:     IBU twice a week for HA.  Assoc sx of dizziness,   HA frequency is daily.  Present for last 8 years.  Located OD.       TPM caused SI.     Objective   Vital Signs:  /84   Pulse 82   Ht 167.6 cm (65.98\")   Wt 111 kg (244 lb)   SpO2 98%   BMI 39.40 kg/m²   Estimated body mass index is 39.4 kg/m² as calculated from the following:    Height as of this encounter: 167.6 cm (65.98\").    Weight as of this encounter: 111 kg (244 lb).           Neurologic Exam     Mental Status   Oriented to person, place, and time.   Speech: speech is normal   Level of consciousness: alert  Knowledge: good and consistent with education.   Normal comprehension.     Cranial Nerves   Cranial nerves II through XII intact.     CN II   Visual fields full to confrontation.   Visual acuity: normal  Right visual field deficit: none  Left visual field deficit: none     CN III, IV, VI   Pupils are equal, round, and reactive to light.  Extraocular motions are normal.   Nystagmus: none   Diplopia: none  Ophthalmoparesis: none  Upgaze: normal  Downgaze: normal  Conjugate gaze: present    CN V   Facial sensation intact.   Right corneal reflex: normal  Left corneal reflex: normal    CN VII   Right facial weakness: none  Left facial weakness: none    CN VIII   Hearing: intact    CN IX, X " Ayanna Gross is a 69 y.o. female on day 10 of admission presenting with Hypotension, unspecified hypotension type.      Subjective   Patient seen and examined. Awake/alert/oriented. Resting in bed. Feels overall improved from yesterday. Currently 100% on room air. RN has been having difficulty obtaining a blood pressure, readings are all over, will attempt again in left arm manual. RN states manual blood pressure was attempted and unable. Discussed with nephrology yesterday, recommend gaging accuracy of blood pressure off of mentation. Will monitor close.            Objective     Last Recorded Vitals  BP 97/78 (BP Location: Left arm, Patient Position: Lying)   Pulse 67   Temp 36.7 °C (98.1 °F) (Oral)   Resp 14   Wt 70.9 kg (156 lb 4.9 oz)   SpO2 100%   Intake/Output last 3 Shifts:    Intake/Output Summary (Last 24 hours) at 12/16/2023 1235  Last data filed at 12/15/2023 2213  Gross per 24 hour   Intake 650 ml   Output 1900 ml   Net -1250 ml         Admission Weight  Weight: 72.6 kg (160 lb) (12/06/23 1301)    Daily Weight  12/15/23 : 70.9 kg (156 lb 4.9 oz)    Image Results  XR chest 1 view  Narrative: Interpreted By:  Judd Lee,   STUDY:  XR CHEST 1 VIEW; 12/14/2023 10:43 am      INDICATION:  Hypoxic respiratory failure.      COMPARISON:  December 8, 2023      ACCESSION NUMBER(S):  [id number]      ORDERING CLINICIAN:  RODRI SURESH      FINDINGS:  RESULT: Right-sided double-lumen dialysis catheter is noted with  distal tip at the level of the right atrium. Vascular stents are  noted in the left subclavian region. Surgical clips are noted in the  left axilla. Cardiac silhouette size is indeterminate as the left  heart border is obscured. There are calcifications involving the  thoracic aorta. Left basilar obscurity obscures the left  hemidiaphragm and left heart border with hazy opacity extending to  the left mid lung. There are degenerative changes of the bilateral  shoulders.      Impression:    Palate: symmetric  Right gag reflex: normal  Left gag reflex: normal    CN XI   Right sternocleidomastoid strength: normal  Left sternocleidomastoid strength: normal    CN XII   Tongue: not atrophic  Fasciculations: absent  Tongue deviation: none    Motor Exam   Muscle bulk: normal  Overall muscle tone: normal    Strength   Strength 5/5 throughout.     Sensory Exam   Light touch normal.     Gait, Coordination, and Reflexes     Gait  Gait: normal    Tremor   Resting tremor: absent  Intention tremor: absent  Action tremor: absent    Reflexes   Reflexes 2+ except as noted.        Physical Exam  Eyes:      Extraocular Movements: EOM normal.      Pupils: Pupils are equal, round, and reactive to light.   Neurological:      Mental Status: She is oriented to person, place, and time.      Cranial Nerves: Cranial nerves 2-12 are intact.      Motor: Motor strength is normal.     Gait: Gait is intact.   Psychiatric:         Speech: Speech normal.        Result Review :    The following data was reviewed by: Jung Mccallum MD on 08/08/2024:                 Assessment and Plan     Diagnoses and all orders for this visit:    1. Chronic migraine without aura without status migrainosus, not intractable (Primary)  Assessment & Plan:  Improving on Qulipta and Ubrevly               Other orders  -     amitriptyline (ELAVIL) 10 MG tablet; Take 1-2 tablets by mouth Every Night.  Dispense: 180 tablet; Refill: 3             Follow Up     No follow-ups on file.  Patient was given instructions and counseling regarding her condition or for health maintenance advice. Please see specific information pulled into the AVS if appropriate.          Increasing density in the left mid and lower lung suggests increasing  infiltrate or pleural effusion.          Signed by: Judd Lee 12/14/2023 10:58 AM  Dictation workstation:   [id number]      Physical Exam  Vitals reviewed.   Constitutional:       Comments: Poor historian   HENT:      Head: Normocephalic and atraumatic.   Eyes:      Extraocular Movements: Extraocular movements intact.      Conjunctiva/sclera: Conjunctivae normal.   Cardiovascular:      Rate and Rhythm: Normal rate and regular rhythm.   Pulmonary:      Effort: Pulmonary effort is normal.      Breath sounds: No wheezing, rhonchi or rales.      Comments: Breath sounds diminished bilaterally  Abdominal:      General: Bowel sounds are normal.      Palpations: Abdomen is soft.      Tenderness: There is no abdominal tenderness.   Skin:     General: Skin is warm and dry.      Comments: Dressing to BLE CDI   Neurological:      General: No focal deficit present.      Mental Status: She is alert and oriented to person, place, and time.         Relevant Results  Lab Results   Component Value Date    GLUCOSE 117 (H) 12/16/2023    CALCIUM 8.2 (L) 12/16/2023     12/16/2023    K 3.2 (L) 12/16/2023    CO2 24 12/16/2023     12/16/2023    BUN 14 12/16/2023    CREATININE 2.10 (H) 12/16/2023     Lab Results   Component Value Date    WBC 9.0 12/16/2023    HGB 10.8 (L) 12/16/2023    HCT 35.1 (L) 12/16/2023    MCV 93 12/16/2023    PLT 97 (L) 12/16/2023     US thoracentesis  Result Date: 12/8/2023  FINDINGS: A total of 750 cc of clear yellow fluid was aspirated. A sample was sent for analysis. The patient tolerated the procedure well.     Ultrasound-guided left thoracentesis.     Signed by: Sandro Hernandez 12/8/2023 3:01 PM     XR chest 1 view  Result Date: 12/8/2023  Decreased size of left effusion following ultrasound-guided thoracentesis with no pneumothorax.   There is some residual left pleural effusion noted with infiltrate in the left mid and  lower lung field.   Signed by: Maria Garcia 12/8/2023 12:51 PM Dictation workstation:   [id number]    XR foot 3+ views bilateral  Result Date: 12/7/2023  1. Right foot: Large heel spur. Atherosclerotic disease. Suggestion of diffuse osteopenia. No sclerosis to suggest osteomyelitis. No subcutaneous air to suggest gangrene. Absent flanges 2nd ray with difficult to visualized distal phalanges of the other digits.   2. Left foot: Large heel spur. Degenerative changes of the hindfoot midfoot and forefoot with atherosclerotic disease. Absent distal phalanx of the great toe. No periostitis or abnormal sclerosis with diffuse osteopenia to suggest osteomyelitis. No subcutaneous air to suggest gangrene.           ECG 12 Lead  Result Date: 12/7/2023  Poor data quality, interpretation may be adversely affected Normal sinus rhythm Right bundle branch block Septal infarct , age undetermined Possible Lateral infarct , age undetermined Abnormal ECG No previous ECGs available     XR chest 1 view  New, moderate left pleural effusion.            Assessment/Plan      Principal Problem:    Hypotension, unspecified hypotension type  Active Problems:    Paroxysmal atrial fibrillation (CMS/HCC)    Peripheral vascular disease (CMS/HCC)    Orthostatic hypotension    Mixed hyperlipidemia    End-stage renal disease on hemodialysis (CMS/HCC)    DM (diabetes mellitus) (CMS/HCC)    Absent pedal pulses      Acute hypoxic respiratory failure  Suspect secondary bacterial pneumonia, suspect gram negative/effusion  CXR showed increasing density in the left mid and lower lung suggests increasing infiltrate or pleural effusion.  Had thoracentesis yesterday, fluid studies sent  ABG, reviewed with attending  COVID positive however no longer hypoxic, discussed with Dr. Scott yesterday, stopped dexamethasone  Leukocytosis  Moist cough productive of tan sputum, sent for culture  Continue IV Zosyn, continue doxy emperically and oral vanc for C diff  MRSA  PCR negative  Passed swallow eval  Now on room air    Bilateral heel wounds   ID and podiatry following, nikolay hutchinson  Wound care consult   doxycycline  Blood cultures negative, final  Bilateral boots to protect heels from pressure   Podiatry recommendations, santyl/medihone, adaptic, 4x4, ABD, kerlix to change daily, no surgical debridement recommended due to vascular status    Metabolic encephalopathy-improved  Unsure if this is her baseline, tech says she waxes and wanes  CT head and ABG unremarkable  Improved    End-stage renal disease on hemodialysis   Consult Dr. Mondragon - appreciate recs  Continue Phoslo   HD per nephrology  Cleared by nephrology for discharge    Hypotension/orthostatic hypotension  Improved, monitor close  Continue midodrine   Check orthostatic vitals  Continue low-dose Toprol in am with hold parameters  Hold Imdur   Left arm fistula and history of right sided mastectomy. BP different in upper extremities than lower extremities.   Hypotension improved with albumin    Difficulty obtaining blood pressures over the past few days, will attempt manual  Fall precautions   Stable    Peripheral vascular disease   vascular studies of lower extremities results as above  Consult vascular surgery, appreciate recommendations  Vascular recommending palliative to follow up on goals of care, may require angiogram as outpatient if she improves, currently not surgical candidate     Paroxysmal atrial fibrillation  Continue low-dose beta blocker   Eliquis for stroke prophylaxis     Thrombocytopenia  PLT decreased to 79, 97, improved   No s/s of bleeding    Mixed hyperlipidemia  Continue statin, fenofibrate     DM (diabetes mellitus)   Hold Tradjenta   AC/HS glucose with SSI coverage   Hypoglycemia protocol     Groin excoriation   Wound care consult   Nystatin powder   Keep skin clean and dry   Sacrum covered with Mepilex.     C-diff   PCR positive and negative toxin.   Vancomycin 125 mg 4 times daily   ID  consult - appreciate recs   Loose stools improved           Malnutrition Diagnosis Status: Ongoing  Malnutrition Diagnosis: Moderate malnutrition related to acute disease or injury  As Evidenced by: 1-2% unintentional wt loss in 1 week (~4% in 2 weeks), Poor intake <75% of estimated energy requirement > 7days, and mild fat and muscle deficits  I agree with the dietitian's malnutrition diagnosis.    Plan  HD per nephrology  Pleural fluid showed moderate polymorphonuclear leukocytes, no organisms seen  Blood culture negative  Continue doxcycline as suppressive therapy for MRSA bacteremia with possible infective endocarditis  Oral vancomycin for a total of 14 days  Dexamethasone discontinued  Maintain precautions  PT/OT  Podiatry signed off, wound care recommendations as above  Per vascular, she is not a candidate for surgery  Continue ATB per ID  Seen by palliative, patient is now a DNRCCA, no intubation  Discharge planning to SNF when medically stable  Morning labs pending      Addendum: Attempted manual, unable to get a good reading. I am concerned that she is hypotensive however to what extent is unclear. Discussed with Dr. Mondragon, will give IV albumin x 3 doses Q 8 hours, albumin was low on last read. Repeat labs on Monday with dialysis.   Discussed with attending as well, will check STAT lactic.    1653: Not able to obtain blood pressure. Cannot give albumin as patient pulled out IV. Multiple IV attempts failed. Discussed with attending, Dr. Figueroa, patient will transfer to ICU, consent for central line and a line obtained from her  by attending. Orders placed for transfer.       Inez Josue, APRN-CNP

## 2024-09-04 ENCOUNTER — TELEPHONE (OUTPATIENT)
Dept: NEUROLOGY | Facility: CLINIC | Age: 29
End: 2024-09-04
Payer: COMMERCIAL

## 2024-09-04 NOTE — TELEPHONE ENCOUNTER
Provider: GEORGE    Caller: PATIENT    Relationship to Patient: SELF    Phone Number: [phone]    Reason for Call: PT FAXED HER FMLA PPW TO THE OFFICE ON 8/30/24 AND IS WANTING TO MAKE SURE THEY WERE RECEIVED.  HER WORK NEEDS THEM BACK BEFORE 9/14/24.    PLEASE CALL PT TO ADVISE     THANK YOU

## 2024-09-04 NOTE — TELEPHONE ENCOUNTER
Caller: MARIN RUTH    Relationship: SELF    Best call back number: [phone]    What orders are you requesting (i.e. lab or imaging): PSA    In what timeframe would the patient need to come in: PRIOR TO APPT 1/30/23    Where will you receive your lab/imaging services: [facility]    Additional notes: PT SCHEDULED A FOLLOW UP WITH DR CUTLER FOR 1/30/23 AND USUALLY HAS PSA LABS DONE, WOULD LIKE TO HAVE THE ORDER SENT CLOSER TO HOME AND CALL HIM ONCE ORDER IS SENT.   Caller: Sarah Giron    Relationship: Self    Best call back number: [phone]    Requested Prescriptions:   Requested Prescriptions     Pending Prescriptions Disp Refills    ubrogepant (Ubrelvy) 100 MG tablet 16 tablet 6     Sig: Take 1 tablet by mouth Daily As Needed (migraines). Take at onset of headache - May repeat x 1 in 2 hours if needed (max of 200 mg/ 24 hrs)        Pharmacy where request should be sent:      Last office visit with prescribing clinician: 8/8/2024   Last telemedicine visit with prescribing clinician: Visit date not found   Next office visit with prescribing clinician: 2/13/2025     Additional details provided by patient: PT IS COMPLETELY OUT OF THIS MEDICATION.  HAS BEEN OUT FOR ABOUT A WEEK.    Does the patient have less than a 3 day supply:  [x] Yes  [] No    Would you like a call back once the refill request has been completed: [] Yes [x] No    If the office needs to give you a call back, can they leave a voicemail: [x] Yes [] No    Philip Walker Rep   09/04/24 14:43 EDT

## 2024-09-05 ENCOUNTER — SPECIALTY PHARMACY (OUTPATIENT)
Dept: ONCOLOGY | Facility: HOSPITAL | Age: 29
End: 2024-09-05
Payer: COMMERCIAL

## 2024-09-05 NOTE — TELEPHONE ENCOUNTER
Rx Refill Note  Requested Prescriptions     Pending Prescriptions Disp Refills    ubrogepant (Ubrelvy) 100 MG tablet 16 tablet 6     Sig: Take 1 tablet by mouth Daily As Needed (migraines). Take at onset of headache - May repeat x 1 in 2 hours if needed (max of 200 mg/ 24 hrs)      Last filled:  01/16/24 w/6  Last office visit with prescribing clinician: 8/8/2024      Next office visit with prescribing clinician: 2/13/2025     Aster Cota MA  09/05/24, 07:38 EDT

## 2024-10-02 ENCOUNTER — TELEPHONE (OUTPATIENT)
Dept: NEUROLOGY | Facility: CLINIC | Age: 29
End: 2024-10-02
Payer: COMMERCIAL

## 2024-10-02 ENCOUNTER — SPECIALTY PHARMACY (OUTPATIENT)
Dept: ONCOLOGY | Facility: HOSPITAL | Age: 29
End: 2024-10-02
Payer: COMMERCIAL

## 2024-10-02 ENCOUNTER — PATIENT MESSAGE (OUTPATIENT)
Dept: NEUROLOGY | Facility: CLINIC | Age: 29
End: 2024-10-02
Payer: COMMERCIAL

## 2024-10-02 RX ORDER — ATOGEPANT 60 MG/1
60 TABLET ORAL DAILY
Qty: 30 TABLET | Refills: 11 | Status: SHIPPED | OUTPATIENT
Start: 2024-10-02

## 2024-10-02 NOTE — TELEPHONE ENCOUNTER
Migraines sx will frequently change and is not a concern.  Recommend taking Ubrevly at first sign of sx

## 2024-10-02 NOTE — TELEPHONE ENCOUNTER
From: Sarah Giron  To: Jung Mccallum  Sent: 10/2/2024 9:48 AM EDT  Subject: Migraine Symptoms     Good morning,    I wanted to reach out to update Dr. Mccallum with a change in my migraines that has occurred this past month as I will likely forget by the time the next appointment occurs.     Recently, I have found that the Qulipta does lower the pain level of the headaches and the frequency of the worst ones. However, I have had approximately one bad migraine a week since the beginning of September. The symptoms I am seeing include the typical photo and audio phobias and fatigue but also include partial numbness to one or both arms, brain fog, dizziness when sitting or standing, and a feeling of being disconnected from my body.     Please let me know if there is reason for concern regarding this.    Thank you,    Sarah

## 2024-10-02 NOTE — TELEPHONE ENCOUNTER
Spoke to pharmacy to see if rx could be picked up by patient.  They state it returns a message to them that it must be filled through a speciality pharmacy.      Spoke to Lluvia in pharmacy and she is looking into the speciality pharmacy attached to the patients insurance plan.    I am offering samples and Lluvia will let the patient know the information that she obtains.    Spoke to patient to inform that Lluvia is working on getting her medication to her through the speciality pharmacy.  She is unable to  samples so I am mailing them to her.  She expressed appreciation.

## 2024-12-04 ENCOUNTER — SPECIALTY PHARMACY (OUTPATIENT)
Dept: ONCOLOGY | Facility: HOSPITAL | Age: 29
End: 2024-12-04
Payer: COMMERCIAL

## 2025-02-13 ENCOUNTER — OFFICE VISIT (OUTPATIENT)
Dept: NEUROLOGY | Facility: CLINIC | Age: 30
End: 2025-02-13
Payer: COMMERCIAL

## 2025-02-13 ENCOUNTER — SPECIALTY PHARMACY (OUTPATIENT)
Dept: ONCOLOGY | Facility: HOSPITAL | Age: 30
End: 2025-02-13
Payer: COMMERCIAL

## 2025-02-13 VITALS
HEIGHT: 66 IN | SYSTOLIC BLOOD PRESSURE: 132 MMHG | HEART RATE: 92 BPM | OXYGEN SATURATION: 99 % | DIASTOLIC BLOOD PRESSURE: 74 MMHG | BODY MASS INDEX: 37.93 KG/M2 | WEIGHT: 236 LBS

## 2025-02-13 DIAGNOSIS — G43.709 CHRONIC MIGRAINE WITHOUT AURA WITHOUT STATUS MIGRAINOSUS, NOT INTRACTABLE: Primary | Chronic | ICD-10-CM

## 2025-02-13 PROCEDURE — 99214 OFFICE O/P EST MOD 30 MIN: CPT | Performed by: PSYCHIATRY & NEUROLOGY

## 2025-02-13 NOTE — ASSESSMENT & PLAN NOTE
Sx are worsening     Increase Elavil 25 - 50 mg qhs    Continue Qulitpa 60 mg daily  Add Nurtec 75 mg qoday

## 2025-02-13 NOTE — PROGRESS NOTES
"Chief Complaint    Migraine    Subjective        Sarah Giron presents to [facility] NEUROLOGY  History of Present Illness    29 y.o. female returns in follow up.  Last visit on 8/8/24 continued Qulipta and Ubrevly, Elavil       Anxiety improved on Elavil.     Ubrevly effective abortive.      HA frequency is 3 - 4 a week.  Present for years.  Intensity 2 - 4.  Located in forehead and behind OD.  Worse in the evenings.       Preventative:  TPM, Elavil   Abortive:  Imitrex, Maxalt not helpful, Ubrevly     MRI Brain, 5/30/23 normal       Reviewed medical records:     IBU twice a week for HA.  Assoc sx of dizziness,   HA frequency is daily.  Present for last 8 years.  Located OD.       TPM caused SI.        Objective   Vital Signs:  /74   Pulse 92   Ht 167.6 cm (65.98\")   Wt 107 kg (236 lb)   SpO2 99%   BMI 38.11 kg/m²   Estimated body mass index is 38.11 kg/m² as calculated from the following:    Height as of this encounter: 167.6 cm (65.98\").    Weight as of this encounter: 107 kg (236 lb).        Neurological Exam  Mental Status  Awake, alert and oriented to person, place and time. Oriented to person, place and time. Speech is normal. Language is fluent with no aphasia. Attention and concentration are normal. Fund of knowledge is appropriate for level of education.    Cranial Nerves  CN III, IV, VI: Extraocular movements intact bilaterally. Pupils equal round and reactive to light bilaterally.  CN V: Facial sensation is normal.  CN VII: Full and symmetric facial movement.  CN IX, X: Palate elevates symmetrically  CN XI: Shoulder shrug strength is normal.  CN XII: Tongue midline without atrophy or fasciculations.    Motor   Strength is 5/5 throughout all four extremities.    Sensory  Sensation is intact to light touch, pinprick, vibration and proprioception in all four extremities.    Reflexes  Deep tendon reflexes are 2+ and symmetric in all four " extremities.    Coordination    Finger-to-nose, rapid alternating movements and heel-to-shin normal bilaterally without dysmetria.    Gait  Normal casual, toe, heel and tandem gait.       Physical Exam  Eyes:      Extraocular Movements: Extraocular movements intact.      Pupils: Pupils are equal, round, and reactive to light.   Neurological:      Motor: Motor strength is normal.     Coordination: Coordination is intact.      Deep Tendon Reflexes: Reflexes are normal and symmetric.   Psychiatric:         Speech: Speech normal.        Result Review :  The following data was reviewed by: Jung Mccallum MD on 02/13/2025:              Assessment and Plan   Diagnoses and all orders for this visit:    1. Chronic migraine without aura without status migrainosus, not intractable (Primary)  Assessment & Plan:  Sx are worsening     Increase Elavil 25 - 50 mg qhs    Continue Qulitpa 60 mg daily  Add Nurtec 75 mg qoday               Other orders  -     amitriptyline (ELAVIL) 25 MG tablet; Take 1-2 tablets by mouth Every Night.  Dispense: 180 tablet; Refill: 3             Follow Up   No follow-ups on file.  Patient was given instructions and counseling regarding her condition or for health maintenance advice. Please see specific information pulled into the AVS if appropriate.

## 2025-02-26 ENCOUNTER — PATIENT MESSAGE (OUTPATIENT)
Dept: NEUROLOGY | Facility: CLINIC | Age: 30
End: 2025-02-26
Payer: COMMERCIAL

## 2025-03-31 ENCOUNTER — TELEPHONE (OUTPATIENT)
Dept: NEUROLOGY | Facility: CLINIC | Age: 30
End: 2025-03-31
Payer: COMMERCIAL

## 2025-03-31 NOTE — TELEPHONE ENCOUNTER
----- Message from Aster THOMPSON sent at 3/31/2025  8:14 AM EDT -----  Regarding: FW: Her insurance denies the use of two CRGP's concurrently  Non Grcp medication?  ----- Message -----  From: Zachary Sawyer, PharmD  Sent: 3/26/2025   8:59 AM EDT  To: Jung Mccallum MD; Aster Cota MA  Subject: Her insurance denies the use of two CRGP's c#    [employer] insurance denied the [facility] QOD as they don't cover two CRGP's. We can attempt to appeal again but it may be challenging and she may be further delayed. Would she be a candidate for a non-CRGP medication?

## 2025-04-22 ENCOUNTER — SPECIALTY PHARMACY (OUTPATIENT)
Dept: ONCOLOGY | Facility: HOSPITAL | Age: 30
End: 2025-04-22
Payer: COMMERCIAL

## 2025-05-22 ENCOUNTER — SPECIALTY PHARMACY (OUTPATIENT)
Dept: ONCOLOGY | Facility: HOSPITAL | Age: 30
End: 2025-05-22
Payer: COMMERCIAL

## 2025-06-30 ENCOUNTER — OFFICE VISIT (OUTPATIENT)
Dept: NEUROLOGY | Facility: CLINIC | Age: 30
End: 2025-06-30
Payer: COMMERCIAL

## 2025-06-30 VITALS
OXYGEN SATURATION: 98 % | DIASTOLIC BLOOD PRESSURE: 82 MMHG | WEIGHT: 243 LBS | HEIGHT: 66 IN | BODY MASS INDEX: 39.05 KG/M2 | HEART RATE: 105 BPM | SYSTOLIC BLOOD PRESSURE: 128 MMHG

## 2025-06-30 DIAGNOSIS — G43.709 CHRONIC MIGRAINE WITHOUT AURA WITHOUT STATUS MIGRAINOSUS, NOT INTRACTABLE: Primary | Chronic | ICD-10-CM

## 2025-06-30 PROCEDURE — 99214 OFFICE O/P EST MOD 30 MIN: CPT | Performed by: PSYCHIATRY & NEUROLOGY

## 2025-06-30 NOTE — ASSESSMENT & PLAN NOTE
HA not controlled     Increased Elavil 50 -100 mg qhs     Continue Qulipta 60 mg daily  Nurtec 75 mg qoday

## 2025-06-30 NOTE — PROGRESS NOTES
"Chief Complaint    Migraine    Subjective        Sarah Giron presents to [facility] NEUROLOGY  History of Present Illness    30 y.o. female returns in follow up.  Last visit on 2/13/25 continued Qulipta, Ubrevly, Elavil,Nurtec.      Anxiety improved on Elavil.     Ubrevly effective abortive.      HA frequency is 1 - 2 a week.  Present for years.  Intensity 2 - 4.  Located in forehead and behind OD.  Worse in the evenings.       Preventative:  TPM, Elavil   Abortive:  Imitrex, Maxalt not helpful, Ubrevly     MRI Brain, 5/30/23 normal       Reviewed medical records:     IBU twice a week for HA.  Assoc sx of dizziness,   HA frequency is daily.  Present for last 8 years.  Located OD.       TPM caused SI.        Objective   Vital Signs:  /82   Pulse 105   Ht 167.6 cm (65.98\")   Wt 110 kg (243 lb)   SpO2 98%   BMI 39.24 kg/m²   Estimated body mass index is 39.24 kg/m² as calculated from the following:    Height as of this encounter: 167.6 cm (65.98\").    Weight as of this encounter: 110 kg (243 lb).      Neurological Exam  Mental Status  Awake, alert and oriented to person, place and time. Oriented to person, place and time. Speech is normal. Language is fluent with no aphasia. Attention and concentration are normal. Fund of knowledge is appropriate for level of education.    Cranial Nerves  CN III, IV, VI: Extraocular movements intact bilaterally. Pupils equal round and reactive to light bilaterally.  CN V: Facial sensation is normal.  CN VII: Full and symmetric facial movement.  CN IX, X: Palate elevates symmetrically  CN XI: Shoulder shrug strength is normal.  CN XII: Tongue midline without atrophy or fasciculations.    Motor   Strength is 5/5 throughout all four extremities.    Sensory  Sensation is intact to light touch, pinprick, vibration and proprioception in all four extremities.    Reflexes  Deep tendon reflexes are 2+ and symmetric in all four " extremities.    Coordination    Finger-to-nose, rapid alternating movements and heel-to-shin normal bilaterally without dysmetria.    Gait  Normal casual, toe, heel and tandem gait.       Physical Exam  Eyes:      Extraocular Movements: Extraocular movements intact.      Pupils: Pupils are equal, round, and reactive to light.   Neurological:      Motor: Motor strength is normal.     Coordination: Coordination is intact.      Deep Tendon Reflexes: Reflexes are normal and symmetric.   Psychiatric:         Speech: Speech normal.        Result Review :  The following data was reviewed by: Jung Mccallum MD on 02/13/2025:              Assessment and Plan   Diagnoses and all orders for this visit:    1. Chronic migraine without aura without status migrainosus, not intractable (Primary)  Assessment & Plan:  HA not controlled     Increased Elavil 50 -100 mg qhs     Continue Qulipta 60 mg daily  Nurtec 75 mg qoday                        Follow Up   No follow-ups on file.  Patient was given instructions and counseling regarding her condition or for health maintenance advice. Please see specific information pulled into the AVS if appropriate.

## 2025-07-28 ENCOUNTER — SPECIALTY PHARMACY (OUTPATIENT)
Dept: ONCOLOGY | Facility: HOSPITAL | Age: 30
End: 2025-07-28
Payer: COMMERCIAL

## 2025-08-04 ENCOUNTER — TELEPHONE (OUTPATIENT)
Facility: HOSPITAL | Age: 30
End: 2025-08-04
Payer: COMMERCIAL

## 2025-08-04 RX ORDER — AMITRIPTYLINE HYDROCHLORIDE 75 MG/1
75 TABLET ORAL NIGHTLY
Qty: 90 TABLET | Refills: 3 | Status: SHIPPED | OUTPATIENT
Start: 2025-08-04 | End: 2026-08-04

## 2025-08-11 ENCOUNTER — PATIENT MESSAGE (OUTPATIENT)
Facility: HOSPITAL | Age: 30
End: 2025-08-11
Payer: COMMERCIAL

## 2025-08-15 ENCOUNTER — TELEPHONE (OUTPATIENT)
Facility: HOSPITAL | Age: 30
End: 2025-08-15
Payer: COMMERCIAL

## 2025-08-26 ENCOUNTER — TELEMEDICINE (OUTPATIENT)
Dept: FAMILY MEDICINE CLINIC | Facility: TELEHEALTH | Age: 30
End: 2025-08-26
Payer: COMMERCIAL

## 2025-08-26 DIAGNOSIS — M26.621 ARTHRALGIA OF RIGHT TEMPOROMANDIBULAR JOINT: Primary | ICD-10-CM

## 2025-08-26 PROCEDURE — 99213 OFFICE O/P EST LOW 20 MIN: CPT | Performed by: NURSE PRACTITIONER

## 2025-08-26 RX ORDER — IBUPROFEN 800 MG/1
800 TABLET, FILM COATED ORAL EVERY 6 HOURS PRN
Qty: 42 TABLET | Refills: 0 | Status: SHIPPED | OUTPATIENT
Start: 2025-08-26

## 2025-08-26 RX ORDER — CYCLOBENZAPRINE HCL 10 MG
TABLET ORAL
Qty: 20 TABLET | Refills: 0 | Status: SHIPPED | OUTPATIENT
Start: 2025-08-26